# Patient Record
Sex: FEMALE | Race: WHITE | NOT HISPANIC OR LATINO | Employment: FULL TIME | URBAN - METROPOLITAN AREA
[De-identification: names, ages, dates, MRNs, and addresses within clinical notes are randomized per-mention and may not be internally consistent; named-entity substitution may affect disease eponyms.]

---

## 2017-01-20 ENCOUNTER — OFFICE VISIT - HEALTHEAST (OUTPATIENT)
Dept: FAMILY MEDICINE | Facility: CLINIC | Age: 47
End: 2017-01-20

## 2017-01-20 DIAGNOSIS — R35.0 FREQUENT URINATION: ICD-10-CM

## 2017-01-25 ENCOUNTER — OFFICE VISIT - HEALTHEAST (OUTPATIENT)
Dept: FAMILY MEDICINE | Facility: CLINIC | Age: 47
End: 2017-01-25

## 2017-01-25 ENCOUNTER — COMMUNICATION - HEALTHEAST (OUTPATIENT)
Dept: FAMILY MEDICINE | Facility: CLINIC | Age: 47
End: 2017-01-25

## 2017-01-25 DIAGNOSIS — N39.0 UTI (URINARY TRACT INFECTION): ICD-10-CM

## 2017-01-25 DIAGNOSIS — R30.0 DYSURIA: ICD-10-CM

## 2017-01-25 ASSESSMENT — MIFFLIN-ST. JEOR: SCORE: 1491.63

## 2017-02-03 ENCOUNTER — OFFICE VISIT - HEALTHEAST (OUTPATIENT)
Dept: FAMILY MEDICINE | Facility: CLINIC | Age: 47
End: 2017-02-03

## 2017-02-03 DIAGNOSIS — R30.0 DYSURIA: ICD-10-CM

## 2017-02-03 ASSESSMENT — MIFFLIN-ST. JEOR: SCORE: 1478.48

## 2017-02-10 ENCOUNTER — OFFICE VISIT - HEALTHEAST (OUTPATIENT)
Dept: FAMILY MEDICINE | Facility: CLINIC | Age: 47
End: 2017-02-10

## 2017-02-10 DIAGNOSIS — Z12.4 PAP SMEAR FOR CERVICAL CANCER SCREENING: ICD-10-CM

## 2017-02-10 DIAGNOSIS — Z00.00 ROUTINE GENERAL MEDICAL EXAMINATION AT A HEALTH CARE FACILITY: ICD-10-CM

## 2017-02-10 DIAGNOSIS — E78.00 ELEVATED CHOLESTEROL: ICD-10-CM

## 2017-02-10 LAB
CHOLEST SERPL-MCNC: 235 MG/DL
FASTING STATUS PATIENT QL REPORTED: NO
HDLC SERPL-MCNC: 64 MG/DL
LDLC SERPL CALC-MCNC: 152 MG/DL
TRIGL SERPL-MCNC: 95 MG/DL

## 2017-02-10 ASSESSMENT — MIFFLIN-ST. JEOR: SCORE: 1467.71

## 2017-02-16 LAB
BKR LAB AP ABNORMAL BLEEDING: NO
BKR LAB AP BIRTH CONTROL/HORMONES: NORMAL
BKR LAB AP CERVICAL APPEARANCE: NORMAL
BKR LAB AP GYN ADEQUACY: NORMAL
BKR LAB AP GYN INTERPRETATION: NORMAL
BKR LAB AP HPV REFLEX: NORMAL
BKR LAB AP LMP: NORMAL
BKR LAB AP PATIENT STATUS: NORMAL
BKR LAB AP PREVIOUS ABNORMAL: 2006
BKR LAB AP PREVIOUS NORMAL: 2014
HIGH RISK?: NO
HPV INTERPRETATION - HISTORICAL: NORMAL
HPV INTERPRETER - HISTORICAL: NORMAL
PATH REPORT.COMMENTS IMP SPEC: NORMAL
RESULT FLAG (HE HISTORICAL CONVERSION): NORMAL

## 2017-02-28 ENCOUNTER — COMMUNICATION - HEALTHEAST (OUTPATIENT)
Dept: FAMILY MEDICINE | Facility: CLINIC | Age: 47
End: 2017-02-28

## 2017-02-28 ENCOUNTER — AMBULATORY - HEALTHEAST (OUTPATIENT)
Dept: LAB | Facility: CLINIC | Age: 47
End: 2017-02-28

## 2017-02-28 DIAGNOSIS — E78.00 ELEVATED CHOLESTEROL: ICD-10-CM

## 2017-02-28 LAB
CHOLEST SERPL-MCNC: 224 MG/DL
FASTING STATUS PATIENT QL REPORTED: YES
HDLC SERPL-MCNC: 60 MG/DL
LDLC SERPL CALC-MCNC: 149 MG/DL
TRIGL SERPL-MCNC: 75 MG/DL

## 2018-04-06 ENCOUNTER — OFFICE VISIT - HEALTHEAST (OUTPATIENT)
Dept: FAMILY MEDICINE | Facility: CLINIC | Age: 48
End: 2018-04-06

## 2018-04-06 DIAGNOSIS — Z12.11 SCREEN FOR COLON CANCER: ICD-10-CM

## 2018-04-06 DIAGNOSIS — Z00.00 ROUTINE GENERAL MEDICAL EXAMINATION AT A HEALTH CARE FACILITY: ICD-10-CM

## 2018-04-06 DIAGNOSIS — Z12.31 VISIT FOR SCREENING MAMMOGRAM: ICD-10-CM

## 2018-04-06 LAB
ALBUMIN UR-MCNC: NEGATIVE MG/DL
APPEARANCE UR: CLEAR
BILIRUB UR QL STRIP: NEGATIVE
CHOLEST SERPL-MCNC: 228 MG/DL
COLOR UR AUTO: YELLOW
FASTING STATUS PATIENT QL REPORTED: NO
GLUCOSE UR STRIP-MCNC: NEGATIVE MG/DL
HDLC SERPL-MCNC: 68 MG/DL
HGB BLD-MCNC: 14.1 G/DL (ref 12–16)
HGB UR QL STRIP: ABNORMAL
KETONES UR STRIP-MCNC: NEGATIVE MG/DL
LDLC SERPL CALC-MCNC: 144 MG/DL
LEUKOCYTE ESTERASE UR QL STRIP: NEGATIVE
NITRATE UR QL: NEGATIVE
PH UR STRIP: 7 [PH] (ref 5–8)
SP GR UR STRIP: 1.01 (ref 1–1.03)
TRIGL SERPL-MCNC: 81 MG/DL
UROBILINOGEN UR STRIP-ACNC: ABNORMAL

## 2018-04-06 ASSESSMENT — MIFFLIN-ST. JEOR: SCORE: 1480.41

## 2018-04-09 LAB
HPV SOURCE: NORMAL
HUMAN PAPILLOMA VIRUS 16 DNA: NEGATIVE
HUMAN PAPILLOMA VIRUS 18 DNA: NEGATIVE
HUMAN PAPILLOMA VIRUS FINAL DIAGNOSIS: NORMAL
HUMAN PAPILLOMA VIRUS OTHER HR: NEGATIVE
SPECIMEN DESCRIPTION: NORMAL

## 2018-04-18 ENCOUNTER — AMBULATORY - HEALTHEAST (OUTPATIENT)
Dept: FAMILY MEDICINE | Facility: CLINIC | Age: 48
End: 2018-04-18

## 2018-04-18 DIAGNOSIS — R87.619 ABNORMAL PAP SMEAR OF CERVIX: ICD-10-CM

## 2018-04-18 LAB
BKR LAB AP ABNORMAL BLEEDING: YES
BKR LAB AP BIRTH CONTROL/HORMONES: ABNORMAL
BKR LAB AP CERVICAL APPEARANCE: NORMAL
BKR LAB AP GYN ADEQUACY: ABNORMAL
BKR LAB AP GYN INTERPRETATION: ABNORMAL
BKR LAB AP HPV REFLEX: ABNORMAL
BKR LAB AP LMP: ABNORMAL
BKR LAB AP PATIENT STATUS: ABNORMAL
BKR LAB AP PREVIOUS ABNORMAL: ABNORMAL
BKR LAB AP PREVIOUS NORMAL: ABNORMAL
HIGH RISK?: NO
INTERPRETING LAB: ABNORMAL
PATH REPORT.COMMENTS IMP SPEC: ABNORMAL
RESULT FLAG (HE HISTORICAL CONVERSION): ABNORMAL

## 2018-05-01 ENCOUNTER — COMMUNICATION - HEALTHEAST (OUTPATIENT)
Dept: FAMILY MEDICINE | Facility: CLINIC | Age: 48
End: 2018-05-01

## 2018-05-02 ENCOUNTER — COMMUNICATION - HEALTHEAST (OUTPATIENT)
Dept: ADMINISTRATIVE | Facility: CLINIC | Age: 48
End: 2018-05-02

## 2018-05-04 ENCOUNTER — HOSPITAL ENCOUNTER (OUTPATIENT)
Dept: MAMMOGRAPHY | Facility: CLINIC | Age: 48
Discharge: HOME | End: 2018-05-04
Attending: FAMILY MEDICINE

## 2018-05-04 DIAGNOSIS — Z12.31 VISIT FOR SCREENING MAMMOGRAM: ICD-10-CM

## 2018-05-22 ENCOUNTER — RECORDS - HEALTHEAST (OUTPATIENT)
Dept: ADMINISTRATIVE | Facility: OTHER | Age: 48
End: 2018-05-22

## 2019-04-05 ENCOUNTER — RECORDS - HEALTHEAST (OUTPATIENT)
Dept: ADMINISTRATIVE | Facility: OTHER | Age: 49
End: 2019-04-05

## 2020-07-23 ENCOUNTER — OFFICE VISIT - HEALTHEAST (OUTPATIENT)
Dept: FAMILY MEDICINE | Facility: CLINIC | Age: 50
End: 2020-07-23

## 2020-07-23 DIAGNOSIS — Z12.31 VISIT FOR SCREENING MAMMOGRAM: ICD-10-CM

## 2020-07-23 DIAGNOSIS — R30.0 BURNING WITH URINATION: ICD-10-CM

## 2020-07-23 DIAGNOSIS — N39.0 URINARY TRACT INFECTION WITHOUT HEMATURIA, SITE UNSPECIFIED: ICD-10-CM

## 2020-07-23 LAB
ALBUMIN UR-MCNC: NEGATIVE MG/DL
APPEARANCE UR: ABNORMAL
BILIRUB UR QL STRIP: NEGATIVE
COLOR UR AUTO: YELLOW
GLUCOSE UR STRIP-MCNC: NEGATIVE MG/DL
HGB UR QL STRIP: ABNORMAL
KETONES UR STRIP-MCNC: NEGATIVE MG/DL
LEUKOCYTE ESTERASE UR QL STRIP: ABNORMAL
NITRATE UR QL: NEGATIVE
PH UR STRIP: 7.5 [PH] (ref 5–8)
SP GR UR STRIP: 1.02 (ref 1–1.03)
UROBILINOGEN UR STRIP-ACNC: ABNORMAL

## 2020-07-23 ASSESSMENT — MIFFLIN-ST. JEOR: SCORE: 1426.97

## 2020-07-24 LAB — BACTERIA SPEC CULT: NO GROWTH

## 2020-08-24 ENCOUNTER — COMMUNICATION - HEALTHEAST (OUTPATIENT)
Dept: FAMILY MEDICINE | Facility: CLINIC | Age: 50
End: 2020-08-24

## 2020-08-24 ENCOUNTER — OFFICE VISIT - HEALTHEAST (OUTPATIENT)
Dept: FAMILY MEDICINE | Facility: CLINIC | Age: 50
End: 2020-08-24

## 2020-08-24 DIAGNOSIS — R87.618 OTHER ABNORMAL CYTOLOGICAL FINDING OF SPECIMEN FROM CERVIX: ICD-10-CM

## 2020-08-24 DIAGNOSIS — Z23 NEED FOR TD VACCINE: ICD-10-CM

## 2020-08-24 DIAGNOSIS — Z00.00 ROUTINE GENERAL MEDICAL EXAMINATION AT A HEALTH CARE FACILITY: ICD-10-CM

## 2020-08-24 DIAGNOSIS — Z12.31 VISIT FOR SCREENING MAMMOGRAM: ICD-10-CM

## 2020-08-24 LAB
ALBUMIN UR-MCNC: NEGATIVE MG/DL
APPEARANCE UR: CLEAR
BILIRUB UR QL STRIP: NEGATIVE
CHOLEST SERPL-MCNC: 234 MG/DL
COLOR UR AUTO: YELLOW
FASTING STATUS PATIENT QL REPORTED: NO
GLUCOSE UR STRIP-MCNC: NEGATIVE MG/DL
HDLC SERPL-MCNC: 71 MG/DL
HGB BLD-MCNC: 14.5 G/DL (ref 12–16)
HGB UR QL STRIP: ABNORMAL
KETONES UR STRIP-MCNC: NEGATIVE MG/DL
LDLC SERPL CALC-MCNC: 148 MG/DL
LEUKOCYTE ESTERASE UR QL STRIP: NEGATIVE
NITRATE UR QL: NEGATIVE
PH UR STRIP: 7 [PH] (ref 5–8)
SP GR UR STRIP: 1.01 (ref 1–1.03)
TRIGL SERPL-MCNC: 75 MG/DL
UROBILINOGEN UR STRIP-ACNC: ABNORMAL

## 2020-08-24 ASSESSMENT — MIFFLIN-ST. JEOR: SCORE: 1451.61

## 2020-08-26 ENCOUNTER — COMMUNICATION - HEALTHEAST (OUTPATIENT)
Dept: FAMILY MEDICINE | Facility: CLINIC | Age: 50
End: 2020-08-26

## 2020-09-03 ENCOUNTER — COMMUNICATION - HEALTHEAST (OUTPATIENT)
Dept: FAMILY MEDICINE | Facility: CLINIC | Age: 50
End: 2020-09-03

## 2020-09-03 LAB
BKR LAB AP ABNORMAL BLEEDING: NO
BKR LAB AP BIRTH CONTROL/HORMONES: NORMAL
BKR LAB AP CERVICAL APPEARANCE: NORMAL
BKR LAB AP GYN ADEQUACY: NORMAL
BKR LAB AP GYN INTERPRETATION: NORMAL
BKR LAB AP HPV REFLEX: NORMAL
BKR LAB AP LMP: NORMAL
BKR LAB AP PATIENT STATUS: NORMAL
BKR LAB AP PREVIOUS ABNORMAL: 2018
BKR LAB AP PREVIOUS NORMAL: 2017
HIGH RISK?: YES
PATH REPORT.COMMENTS IMP SPEC: NORMAL
RESULT FLAG (HE HISTORICAL CONVERSION): NORMAL

## 2020-10-28 ENCOUNTER — HOSPITAL ENCOUNTER (OUTPATIENT)
Dept: MAMMOGRAPHY | Facility: CLINIC | Age: 50
Discharge: HOME | End: 2020-10-28
Attending: FAMILY MEDICINE

## 2020-10-28 DIAGNOSIS — Z12.31 VISIT FOR SCREENING MAMMOGRAM: ICD-10-CM

## 2021-05-30 VITALS — WEIGHT: 188 LBS | BODY MASS INDEX: 29.23 KG/M2

## 2021-05-30 VITALS — BODY MASS INDEX: 28.72 KG/M2 | WEIGHT: 183 LBS | HEIGHT: 67 IN

## 2021-05-30 VITALS — HEIGHT: 67 IN | BODY MASS INDEX: 29.41 KG/M2 | WEIGHT: 187.4 LBS

## 2021-05-30 VITALS — BODY MASS INDEX: 28.96 KG/M2 | HEIGHT: 67 IN | WEIGHT: 184.5 LBS

## 2021-06-01 VITALS — WEIGHT: 185.8 LBS | HEIGHT: 67 IN | BODY MASS INDEX: 29.16 KG/M2

## 2021-06-04 VITALS
HEART RATE: 64 BPM | BODY MASS INDEX: 27.31 KG/M2 | HEIGHT: 67 IN | TEMPERATURE: 98 F | DIASTOLIC BLOOD PRESSURE: 64 MMHG | OXYGEN SATURATION: 96 % | WEIGHT: 174.02 LBS | RESPIRATION RATE: 16 BRPM | SYSTOLIC BLOOD PRESSURE: 120 MMHG

## 2021-06-04 VITALS
DIASTOLIC BLOOD PRESSURE: 70 MMHG | HEIGHT: 68 IN | SYSTOLIC BLOOD PRESSURE: 101 MMHG | OXYGEN SATURATION: 98 % | BODY MASS INDEX: 26.83 KG/M2 | WEIGHT: 177 LBS | HEART RATE: 61 BPM

## 2021-06-08 NOTE — PROGRESS NOTES
Penelope Kennedy is a 46 y.o. here for a Pap smear and physical exam. Patient is overall doing well. She is not fasting today.    Health Maintenance: Her last pap smear was done on 16 and returned normal. She has a history of abnormal pap smears and will have another pap smear today. She is due for a mammogram, her last was two years ago.  She was given information on how to go about scheduling that.  She is up to date on her immunizations. She has no medication allergies. All medications have been reconciled.     She is currently finishing up her dosage of Macrobid which she is on for urinary tract infection.  She feels like she has had complete resolution of her symptoms.  She will finish out the entire 10 day course as she had recurrence of her symptoms when she finished a 3 day course of Septra.  She will call with additional problems or concerns regarding that.    Healthy Habits:   Regular Exercise: No  Sunscreen Use: Yes  Healthy Diet: Yes  Dental Visits Regularly: Yes  Seat Belt: Yes  Sexually active: Yes  Self Breast Exam Monthly:Yes  Hemoccults: No  Flex Sig: No  Colonoscopy: No  Lipid Profile: Yes  Glucose Screen: Yes  Prevention of Osteoporosis: Yes  Last Dexa: No  Guns at Home:  Yes  Guns Safety Locks:  Yes  Domestic Violence:  No    Current Outpatient Medications Include:  No current outpatient prescriptions on file.    Allergies:  No Known Allergies    Past Medical History:   Diagnosis Date     abnormal pap smear 2006    had colposcopy, all fine since     Depression with anxiety      Normal delivery     x2     Ovarian cyst        Past Surgical History:   Procedure Laterality Date     COLPOSCOPY  ,      elective       pregnancy termination     PELVIC LAPAROSCOPY      for heavy bleeding and missed periods, noted endometriosis       Immunization History   Administered Date(s) Administered     Td, historic 2004     Tdap 2009       Family History   Problem  Relation Age of Onset     Adopted: Yes     Cancer Mother      malignant neoplasma of fallopian tube       Social History     Social History     Marital status:      Spouse name: Enzo     Number of children: 2     Years of education: N/A     Occupational History           [employer]     Social History Main Topics     Smoking status: Former Smoker     Packs/day: 0.50     Years: 20.00     Smokeless tobacco: Never Used     Alcohol use 0.5 oz/week     1 drink(s) per week      Comment: Occasionally     Drug use: No     Sexual activity: Yes     Partners: Male     Birth control/ protection: Surgical      Comment:  vasectomy     Other Topics Concern     Not on file     Social History Narrative   She works as a  for [employer]. Her son continues bowling and is planning to go to college for this in [place]. He is retaking the ACT tomorrow.     Last cholesterol:   Lab Results   Component Value Date    CHOL 235 (H) 02/10/2017    CHOL 197 01/05/2016    CHOL 207 (H) 12/17/2014     Lab Results   Component Value Date    HDL 64 02/10/2017    HDL 62 01/05/2016    HDL 74 12/17/2014     Lab Results   Component Value Date    LDLCALC 152 (H) 02/10/2017    LDLCALC 121 01/05/2016    LDLCALC 122 12/17/2014     Lab Results   Component Value Date    TRIG 95 02/10/2017    TRIG 70 01/05/2016    TRIG 57 12/17/2014         Last mammogram: 2015, given information and a phone number to call to schedule a mammogram in the near future.    Birth Control Method: Vasectomy   High Risk/Behavior: None    PREVENTATIVE SERVICES  Preventative services were reviewed today, 2/10/2017    LMP: Patient's last menstrual period was 01/20/2017.  Menstrual Regularity: Every 3 to 4 months  Flow: Normal    REVIEW OF SYSTEMS:  Denies fever, chills, visual changes, fatigue, myalgias, nasal congestion, rhinorrhea, ear pain or discharge, sore throat, swollen glands, breast mass, nipple discharge, breast changes, abdominal pain,  "nausea, vomiting, diarrhea, constipation, cough, shortness of breath, chest pain, weight change, change in bowel habits, melena, rectal bleeding, dysuria, frequency, urgency, hematuria, polyuria, polydipsia, polyphagia, joint pain or swelling or erythema, edema, rash, weakness, paresthesias, vaginal discharge or bleeding or mood changes.    She reports that she is feeling much better since starting her 10 day course of Macrobid for a UTI.  She has a couple days left of this. She does not get much exercise and feels tightness in her calves occasionally after sitting for long periods of time.  At this point when she gets up and walks around a little bit it seems like things are better.  We will continue to monitor this for now and if she has additional concerns will let me know.  Remainder of review of systems was negative.      PHYSICAL EXAM:  On exam, patient is a WD, WN 46 y.o. female in NAD.  Visit Vitals     /80     Pulse 64     Temp 97.6  F (36.4  C)     Ht 5' 7\" (1.702 m)     Wt 183 lb (83 kg)     LMP 01/20/2017     Breastfeeding No     BMI 28.66 kg/m2     Head normocephalic, atraumatic.  Eyes PERRL, ears TM s clear bilaterally.  Throat without significant erythemia or exudate.  Neck was supple, full range of motion. No significant lymphadenopathy or thyromegaly was appreciated.  Lungs clear to auscultation  Heart regular rate and rhythm.  Breast exam was done. No masses were appreciated. Axilla were clear bilaterally. No nipple discharge was appreciated. Self breast exam was reviewed and taught today.  Abdomen was soft, nontender, nondistended. No masses or organomegaly were palpated. Positive bowel sounds were appreciated.  Extremities with full range of motion of all 4 extremities were noted.  Deep tendon reflexes were equal and symmetrical. Motor and sensation were intact to both the upper and lower extremities.  Cranial nerves 2 through 12 were grossly intact.  EOM were intact.  Pelvic exam was done. "  External genitalia appeared normal. Speculum was introduced and the Pap smear obtained. Bimanual exam revealed uterus to be normal size and adenexa without palpable masses.     Recent Results (from the past 240 hour(s))   Urinalysis Macroscopic   Result Value Ref Range    Color, UA Yellow Colorless, Yellow, Straw, Light Yellow    Clarity, UA Clear Clear    Glucose, UA Negative Negative    Bilirubin, UA Negative Negative    Ketones, UA Negative Negative    Specific Gravity, UA 1.010 1.002 - 1.030    Blood, UA Moderate (!) Negative    pH, UA 7.5 4.5 - 8.0    Protein, UA Negative Negative mg/dL    Urobilinogen, UA 0.2 E.U./dL 0.2 E.U./dL, 1.0 E.U./dL    Nitrite, UA Negative Negative    Leukocytes, UA Large (!) Negative   Culture, Urine   Result Value Ref Range    Culture No Growth    Urine,Microscopic   Result Value Ref Range    Bacteria, UA Few (!) None Seen hpf    RBC, UA 0-2 None Seen, 0-2 hpf    WBC, UA 25-50 (!) None Seen, 0-5 hpf    Squam Epithel, UA 0-5 None Seen, 0-5 lpf    WBC Clumps Present (!) None Seen    Trans Epithel, UA 0-5 (!) None Seen lpf   Hemoglobin   Result Value Ref Range    Hemoglobin 13.6 12.0 - 16.0 g/dL   Lipid Cascade   Result Value Ref Range    Cholesterol 235 (H) <=199 mg/dL    Triglycerides 95 <=149 mg/dL    HDL Cholesterol 64 >=50 mg/dL    LDL Calculated 152 (H) <=129 mg/dL    Patient Fasting > 8hrs? No    Urinalysis Macroscopic   Result Value Ref Range    Color, UA Yellow Colorless, Yellow, Straw, Light Yellow    Clarity, UA Clear Clear    Glucose, UA Negative Negative    Bilirubin, UA Negative Negative    Ketones, UA Negative Negative    Specific Gravity, UA 1.015 1.002 - 1.030    Blood, UA Negative Negative    pH, UA 7.0 4.5 - 8.0    Protein, UA Negative Negative mg/dL    Urobilinogen, UA 0.2 E.U./dL 0.2 E.U./dL, 1.0 E.U./dL    Nitrite, UA Negative Negative    Leukocytes, UA Negative Negative       ASSESSMENT: 46 y.o. female physical exam and pap smear.    PLAN:     Routine general  medical examination at a health care facility [Z00.00]    1. Routine general medical examination at a health care facility  - Hemoglobin  - Lipid Cascade  - Urinalysis Macroscopic    2. Pap smear for cervical cancer screening  - Gynecologic Cytology (PAP Smear)        Patient is a 46 y.o. female who is overall doing well.  She is just finishing her course of Macrobid for her UTI.  She feels like things are better and if she has increasing symptoms after she finishes the Macrobid will certainly let me know.  Pap smear was repeated today since she does have a history of an abnormal Pap smear.  She otherwise feels like things are doing well and really has no other concerns.  All of her questions and concerns were addressed today.  If she has additional problems or concerns should let me know.    Will contact her with the results of the labs when available.    Karime Duenas M.D.       ADDITIONAL HISTORY SUMMARIZED (2): None.  DECISION TO OBTAIN EXTRA INFORMATION (1): None.   RADIOLOGY TESTS (1): None.  LABS (1): Ordered labs.  MEDICINE TESTS (1): None.  INDEPENDENT REVIEW (2 each): None.     The visit lasted a total of 27 minutes face to face with the patient. Over 50% of the time was spent counseling and educating the patient about annual physical exam.    I, Sita Vaughn, am scribing for and in the presence of, Dr. Duenas.    I, Dr. Duenas, personally performed the services described in this documentation, as scribed by Sita Vaughn in my presence, and it is both accurate and complete.    Total data points: 1

## 2021-06-08 NOTE — PROGRESS NOTES
ASSESSMENT:  Dysuria [R30.0]    1. Dysuria  - Urinalysis Macroscopic  - Culture, Urine  - Urine,Microscopic  - nitrofurantoin, macrocrystal-monohydrate, (MACROBID) 100 MG capsule; Take 1 capsule (100 mg total) by mouth 2 (two) times a day for 10 days.  Dispense: 20 capsule; Refill: 0      Patient overall seems to be doing okay.  She had evidence for urinary tract infection on 1/25/2017 when I saw her and was took the prescription for Septra that she was given at the walk-in center on 1/20/2017.  This is a 3 day treatment and she felt better after she had finished it but this morning now she wakes up with recurrence of her symptoms.  Urinalysis today shows large amount of leukocyte esterase as well as white blood cell clumps.  I am going to go ahead and treat her with Macrobid now for urinary tract infection.  We discussed the fact that I suspect that the Septra partially treated her since she had almost immediate relief of her symptoms but now they have recurred since she only took for 3 days treatment.  I did send a prescription for 10 day treatment for Macrobid.  Will contact her with results of the urine culture from today.  She should once again have immediate relief of her symptoms when she starts on this medication.  If she has any worsening of her symptoms will certainly let me know.  If she does not have gradual improvement and complete resolution of her symptoms over the next few days she certainly should let me know as well.  We otherwise will see her on an as-needed basis.  All of her questions were answered today.    PLAN:  There are no Patient Instructions on file for this visit.    Orders Placed This Encounter   Procedures     Culture, Urine     Urinalysis Macroscopic     Urine,Microscopic     Medications Discontinued During This Encounter   Medication Reason     multivitamin with minerals (THERA-M) 9 mg iron-400 mcg Tab tablet      omega-3 fatty acids-vitamin E (FISH OIL) 1,000 mg cap        Return if  symptoms worsen or fail to improve.    CHIEF COMPLAINT:  Chief Complaint   Patient presents with     Urinary Tract Infection     Recheck UTI, lower abdominal cramping/fullnes just finished antibiotic 1/28/17       HISTORY OF PRESENT ILLNESS:  Penelope is a 46 y.o. female presenting to the clinic today for a follow up regarding a UTI. She was seen at [facility] on 1/20/17 after experiencing urinary frequency for a few days. Her UA was suggestive of a UTI and she was prescribed Bactrim to take if symptoms worsened or the urine culture was positive. She was called the next day and was told her culture was negative so she did not start this antibiotic. With worsening symptoms, including painful urination and hematuria, she visited the clinic on 1/25/17.  Her urinalysis showed white blood cell clumps and was highly suggestive of a urinary tract infection and therefore I felt antibiotics were indicated.  She noted that she had the prescription for Septra already and so she was just going to fill that.  She started taking the septra and felt immediate relief. She finished this antibiotic on 1/28/17 and mentions that she felt good until this morning when she awoke feeling off. She is not currently having dysuria, but states that she has a feeling that she is about to get another UTI.  She denies that she seen any blood in her urine and she does not have any pain back pain or fevers currently.  She has had some lower abdominal cramping and fullness, but denies any blood in her urine.      Health Maintenance: She is coming in next week for her annual physical exam.       REVIEW OF SYSTEMS:   Denies fever, chills, visual changes, fatigue, myalgias, nasal congestion, rhinorrhea, ear pain or discharge, sore throat, swollen glands, abdominal pain, nausea, vomiting, diarrhea, constipation, cough, shortness of breath, chest pain, weight change, change in bowel habits, dysuria, frequency, urgency, hematuria, polyuria, polydipsia,  "polyphagia, rash, weakness, paresthesias, vaginal discharge or bleeding or mood changes.  She denies back pain. She mentions that her eyes became slightly swollen while taking Septra, but she otherwise tolerated it well. Remainder of review of systems was negative.    PFSH:  She has no known allergies to medications. Her sister slipped and broke her ankle earlier this week and had surgery yesterday.     Patient Active Problem List   Diagnosis     Irregular Length Of Menstrual Periods     Ovarian Cyst     Pain During Urination (Dysuria)       No Known Allergies    Past Medical History:   Diagnosis Date     abnormal smear of cervix 2014     Depression with anxiety      Normal delivery     x2     Ovarian cyst        Past Surgical History:   Procedure Laterality Date     COLPOSCOPY  ,      elective       pregnancy termination     PELVIC LAPAROSCOPY      for heavy bleeding and missed periods, noted endometriosis       History   Smoking Status     Former Smoker     Packs/day: 0.50     Years: 20.00   Smokeless Tobacco     Never Used       TOBACCO USE:  History   Smoking Status     Former Smoker     Packs/day: 0.50     Years: 20.00   Smokeless Tobacco     Never Used       VITALS:  Vitals:    17 0928   BP: 116/70   Patient Site: Right Arm   Patient Position: Sitting   Cuff Size: Adult Large   Pulse: 76   Resp: 14   Temp: 98.3  F (36.8  C)   TempSrc: Oral   Weight: 184 lb 8 oz (83.7 kg)   Height: 5' 7.25\" (1.708 m)     Wt Readings from Last 3 Encounters:   17 184 lb 8 oz (83.7 kg)   17 187 lb 6.4 oz (85 kg)   17 188 lb (85.3 kg)     Body mass index is 28.68 kg/(m^2).    PHYSICAL EXAM:  GENERAL APPEARANCE: A&A, NAD, well hydrated, well nourished  SKIN:  Normal skin turgor, no lesions/rashes   CV: RRR, no M/G/R   LUNGS: CTAB  ABDOMEN: S&NT, no masses or organomegaly, positive bowel sounds.  No CVA tenderness is appreciated.  No tenderness was appreciated over her " bladder.  EXTREMITY: no edema   NEURO: no gross deficits   PSYCHIATRIC;  Mood appropriate, memory intact    LABS:     Recent Results (from the past 240 hour(s))   Urinalysis Macroscopic   Result Value Ref Range    Color, UA Yellow Colorless, Yellow, Straw, Light Yellow    Clarity, UA Clear Clear    Glucose, UA Negative Negative    Bilirubin, UA Negative Negative    Ketones, UA Negative Negative    Specific Gravity, UA 1.010 1.002 - 1.030    Blood, UA Moderate (!) Negative    pH, UA 7.5 4.5 - 8.0    Protein, UA Negative Negative mg/dL    Urobilinogen, UA 0.2 E.U./dL 0.2 E.U./dL, 1.0 E.U./dL    Nitrite, UA Negative Negative    Leukocytes, UA Large (!) Negative   Culture, Urine   Result Value Ref Range    Culture No Growth    Urine,Microscopic   Result Value Ref Range    Bacteria, UA Few (!) None Seen hpf    RBC, UA 0-2 None Seen, 0-2 hpf    WBC, UA 25-50 (!) None Seen, 0-5 hpf    Squam Epithel, UA 0-5 None Seen, 0-5 lpf    WBC Clumps Present (!) None Seen    Trans Epithel, UA 0-5 (!) None Seen lpf       ADDITIONAL HISTORY SUMMARIZED (2): Reviewed walk in note from 1/20/17 regarding UTI.  DECISION TO OBTAIN EXTRA INFORMATION (1): None.   RADIOLOGY TESTS (1): None.  LABS (1): Ordered labs.  MEDICINE TESTS (1): None.  INDEPENDENT REVIEW (2 each): None.     The visit lasted a total of 12 minutes face to face with the patient. Over 50% of the time was spent counseling and educating the patient about UTI.    I, Kavin Vaughn, am scribing for and in the presence of, Dr. Duenas.    I, Dr. Duenas, personally performed the services described in this documentation, as scribed by Kavin Vaughn in my presence, and it is both accurate and complete.    MEDICATIONS:  Current Outpatient Prescriptions   Medication Sig Dispense Refill     nitrofurantoin, macrocrystal-monohydrate, (MACROBID) 100 MG capsule Take 1 capsule (100 mg total) by mouth 2 (two) times a day for 10 days. 20 capsule 0     No current facility-administered  medications for this visit.        Total data points: 3

## 2021-06-08 NOTE — PROGRESS NOTES
Assessment/Plan    ICD-10-CM    1. Frequent urination R35.0 Urinalysis-UC if Indicated     Culture, Urine     sulfamethoxazole-trimethoprim (BACTRIM DS) 800-160 mg per tablet     Symptoms and UA not certain for UTI.  Given paper prescription to take if symptoms become more classic, otherwise await culture results.       Patient Instructions   Drink lots of water.   Urine culture is pending.   Stop azo.   If symptoms increase can start the antibiotic otherwise wait for culture results.   Follow up if not improving or new symptoms.         The risks, benefits, and treatment options of prescribed medications or other treatments have been discussed with the patient. The patient should call or schedule a follow up appointment if not improvement or any new symptoms.       Subjective:    Penelope Kennedy is a 46 y.o. female who presents with   Chief Complaint   Patient presents with     Urinary Frequency     Poss UTI   .     She is noting pressure with urination, no burning.  No increased frequency now but this morning she felt she did.  She has been taking otc azo which has helped. She had symptoms in March that the culture was negative.  No back or flank pain.  No fever.  She is just getting over her period. Her periods have been irregular due to perimenopause.          Past medical history, social history and medications reviewed in the medical record.     ROS:  5 point review of systems negative except as noted above in the HPI, including Gen, Resp., CV, GI &  system review.     Exam:  Visit Vitals     /64 (Patient Site: Right Arm, Patient Position: Sitting, Cuff Size: Adult Regular)     Pulse 64     Temp 98.4  F (36.9  C) (Oral)     Wt 188 lb (85.3 kg)     LMP 11/23/2015     SpO2 98%     Breastfeeding No     BMI 29.23 kg/m2        Gen:  No acute distress  Lungs: Chest is clear, no wheezing or rales. Symmetric air entry throughout both lung fields.  Cardiac: regular rate and rhythm, no murmur rub or  gallop  Abdomen: soft, no hepatosplenomegaly.  No tenderness    Musculoskeletal:  No costovertebral angle tenderness   SKIN:  No rash       Results for orders placed or performed in visit on 01/20/17   Urinalysis-UC if Indicated   Result Value Ref Range    Color, UA Yellow Colorless, Yellow, Straw, Light Yellow    Clarity, UA Clear Clear    Glucose, UA Negative Negative    Bilirubin, UA Negative Negative    Ketones, UA Negative Negative    Specific Gravity, UA 1.010 1.002 - 1.030    Blood, UA Small (!) Negative    pH, UA 7.0 4.5 - 8.0    Protein, UA Negative Negative mg/dL    Urobilinogen, UA 0.2 E.U./dL 0.2 E.U./dL, 1.0 E.U./dL    Nitrite, UA Positive (!) Negative    Leukocytes, UA Trace (!) Negative    Bacteria, UA Few (!) None Seen hpf    RBC, UA 0-2 None Seen, 0-2 hpf    WBC, UA 0-5 None Seen, 0-5 hpf    Squam Epithel, UA 0-5 None Seen, 0-5 lpf       No results found.

## 2021-06-08 NOTE — PROGRESS NOTES
PROGRESS NOTE   1/25/2017    SUBJECTIVE:  Penelope Kennedy is a 46 y.o. female  who presents for   Chief Complaint   Patient presents with     Dysuria     Check dysuria, bloody discharge, frequency, urgency x 1 week     Patient comes in today for recheck of painful urination.  She was seen on 1/20/2017 at [facility] [place] for the same symptoms.  She notes that this all started last Friday when she thought that something felt funny when she went to the bathroom.  By Saturday morning she knew that there was a problem because she was having pain with urination and that stinging sensation as she ended her flow of urine.  She was seen in [facility] [place] and her urinalysis was not classic for urinary tract infection so they gave her paper copy of an antibiotic and told her to start the medicine if it seems like things were getting worse.  They called her the next day and told her that a urine culture was negative and therefore she never started the antibiotic.  She notes that on Sunday her symptoms seem to be better but by Monday she was still noticing a little bit of blood and it seemed like she was a little bit worse.  Yesterday she seemed to get a little bit better but now this morning again she is having lots of pain with urination and she also is noticing some bloody discharge when she urinates.  She does note that she has had increased urinary frequency when the pain gets bad with urination.  She has not been running any kind of fever at all and she has not had any unusual vaginal discharge.  She has not had any new sexual partners.  She just got over her menstrual cycle.  She only gets her menstrual cycle about every 6-9 months and it seems like when she does get a menstrual cycle she also gets these types of symptoms.  She is once again today miserable in terms of her symptoms similar to what she was on Saturday but in in between times she does seem like she had a little improvement in her symptoms which is a bit  "unusual.  She has no other symptoms of being ill other than the burning with urination and frequency.    Patient Active Problem List   Diagnosis     Irregular Length Of Menstrual Periods     Ovarian Cyst     Pain During Urination (Dysuria)     abnormal smear of cervix       Current Outpatient Prescriptions   Medication Sig Dispense Refill     multivitamin with minerals (THERA-M) 9 mg iron-400 mcg Tab tablet Take 1 tablet by mouth daily.       omega-3 fatty acids-vitamin E (FISH OIL) 1,000 mg cap Take 2,000 mg by mouth daily.       No current facility-administered medications for this visit.        No Known Allergies    Past Medical History   Diagnosis Date     Depression with anxiety      Normal delivery      x2     Ovarian cyst        Past Surgical History   Procedure Laterality Date     Colposcopy  ,      Elective        pregnancy termination     Pelvic laparoscopy       for heavy bleeding and missed periods, noted endometriosis       History   Smoking Status     Former Smoker     Packs/day: 0.50     Years: 20.00   Smokeless Tobacco     Never Used       OBJECTIVE:     Visit Vitals     /66 (Patient Site: Right Arm, Patient Position: Sitting, Cuff Size: Adult Regular)     Pulse 74  Comment: regular     Temp 97.6  F (36.4  C) (Oral)     Resp 14  Comment: regular     Ht 5' 7.25\" (1.708 m)     Wt 187 lb 6.4 oz (85 kg)     LMP 2015     BMI 29.13 kg/m2       Physical Exam:  GENERAL APPEARANCE: A&A, NAD, well hydrated, well nourished  SKIN:  Normal skin turgor, no lesions/rashes   CV: RRR, no M/G/R   LUNGS: CTAB  ABDOMEN: S&NT, no masses or organomegaly, positive bowel sounds.  No CVA tenderness was appreciated.  EXTREMITY: no edema   NEURO: no gross deficits   PSYCHIATRIC;  Mood appropriate, memory intact    LABS:     Recent Results (from the past 240 hour(s))   Urinalysis-UC if Indicated   Result Value Ref Range    Color, UA Yellow Colorless, Yellow, Straw, Light Yellow    Clarity, UA " Clear Clear    Glucose, UA Negative Negative    Bilirubin, UA Negative Negative    Ketones, UA Negative Negative    Specific Gravity, UA 1.010 1.002 - 1.030    Blood, UA Small (!) Negative    pH, UA 7.0 4.5 - 8.0    Protein, UA Negative Negative mg/dL    Urobilinogen, UA 0.2 E.U./dL 0.2 E.U./dL, 1.0 E.U./dL    Nitrite, UA Positive (!) Negative    Leukocytes, UA Trace (!) Negative    Bacteria, UA Few (!) None Seen hpf    RBC, UA 0-2 None Seen, 0-2 hpf    WBC, UA 0-5 None Seen, 0-5 hpf    Squam Epithel, UA 0-5 None Seen, 0-5 lpf   Culture, Urine   Result Value Ref Range    Culture No Growth    Urinalysis Macroscopic   Result Value Ref Range    Color, UA Yellow Colorless, Yellow, Straw, Light Yellow    Clarity, UA Slightly Cloudy (!) Clear    Glucose, UA Negative Negative    Bilirubin, UA Negative Negative    Ketones, UA Negative Negative    Specific Gravity, UA 1.015 1.002 - 1.030    Blood, UA Moderate (!) Negative    pH, UA 7.0 4.5 - 8.0    Protein, UA Negative Negative mg/dL    Urobilinogen, UA 0.2 E.U./dL 0.2 E.U./dL, 1.0 E.U./dL    Nitrite, UA Negative Negative    Leukocytes, UA Moderate (!) Negative   Culture, Urine   Result Value Ref Range    Culture No Growth    Urine,Microscopic   Result Value Ref Range    Bacteria, UA Few (!) None Seen hpf    RBC, UA 0-2 None Seen, 0-2 hpf    WBC, UA 25-50 (!) None Seen, 0-5 hpf    Squam Epithel, UA 0-5 None Seen, 0-5 lpf    WBC Clumps Present (!) None Seen       ASSESSMENT/PLAN:     Dysuria [R30.0]      1. Dysuria  - Urinalysis Macroscopic  - Urine,Microscopic  - Culture, Urine    2. UTI (urinary tract infection)    Patient overall seems to be doing okay.  Her urinalysis today is definitely suggestive of urinary tract infection and I am going to go ahead and have her start an antibiotic.  She has the prescription for Septra that was written a couple of days ago that she never filled and therefore will get that filled and try that.  I suspect that that will work very well for  her urinary tract infection given the fact that she does not get these very often.  She is going to go have the Septra filled and if she has more problems or concerns will let me know.  If she has worsening of her symptoms or does not have gradual improvement in her symptoms she should let me know as well.  Certainly she develops any fever or back pain or changes in her symptoms she should also let me know.  She is due for a physical exam and has scheduled that in the next couple of weeks and we can follow-up at that time and do a repeat urinalysis to make sure that this is completely resolved.  All of her questions were answered today.  We will see her back in a few weeks for her physical exam but certainly sooner if she has increasing problems.  Karime Duenas MD

## 2021-06-09 NOTE — PROGRESS NOTES
"Chief Complaint   Patient presents with     Urinary Tract Infection     x 1 week - tightnening/pulling pressure, cloudy       HPI: 50-year-old female, postmenopausal, presents today with 1 week of bladder pressure and a with cloudy urine.  She has a past history of urinary tract infections.    ROS: No back pain.  No fever    SH: Reviewed-see Snapshot for review.     FH: Reviewed-see Snapshot for review.    Meds:  Penelope has a current medication list which includes the following prescription(s): ciprofloxacin hcl.    O:  /64   Pulse 64   Temp 98  F (36.7  C)   Resp 16   Ht 5' 7\" (1.702 m)   Wt 174 lb 0.3 oz (78.9 kg)   SpO2 96%   Breastfeeding No   BMI 27.26 kg/m    Constitutional:    --Vitals as above  --No acute distress  Eyes-  --Sclera noninjected  --Lids and conjunctiva normal  Muscle skeletal-  --No CVA tenderness to palpation  Abdomen-  --Bladder soft nontender  Skin-  --Pink and dry    UA positive for leukocytes and RBCs    A/P:   1.  Burning with urination  UA positive.  Will treat with Cipro  - Urinalysis Macroscopic  - Culture, Urine    3. Urinary tract infection without hematuria, site unspecified  - ciprofloxacin HCl (CIPRO) 500 MG tablet; Take 1 tablet (500 mg total) by mouth 2 (two) times a day for 7 days.  Dispense: 14 tablet; Refill: 0  "

## 2021-06-10 NOTE — TELEPHONE ENCOUNTER
I did call patient in follow-up this morning and find out how she was doing.  She notes that she started to ice her shoulder yesterday and feels like today things are improved.  She does feel like she is gradually having improvement in her symptoms.  We discussed the fact that she should continue to have gradual improvement in her symptoms and if that is not the case she certainly should let us know.  All of her questions were answered today.  She notes that she is feeling a lot better about things now that she seems to be gradually improving.  We did discuss how important it is to move her arm around and keep those muscles and joints loose.

## 2021-06-10 NOTE — PROGRESS NOTES
Assessment:      Healthy female exam.      Plan:       All questions answered.  Await pap smear results.      ASSESSMENT: 50 y.o. female physical exam and pap smear.    PLAN:     Routine general medical examination at a health care facility [Z00.00]    1. Routine general medical examination at a health care facility  - Gynecologic Cytology (PAP Smear)  - Hemoglobin  - Urinalysis Macroscopic  - Lipid Cascade  - HPV High Risk DNA Cervical    2. Other abnormal cytological finding of specimen from cervix    3. Visit for screening mammogram  - Mammo Screening Bilateral; Future    4. Need for Td vaccine  - Td, Preservative Free (green label)      Patient is a 50 y.o. female who is overall doing well.  She is feeling well.  She does have a history of atypical glandular cells on her Pap smear in 2018.  Colposcopy was done at that time.  Follow-up Pap smear was completed today.  We will contact her with results of that when it returns.  She is due for mammogram and that was scheduled.  She is up-to-date with her colonoscopy.  We did discuss the fact that she is due for tetanus vaccination today.  Td was given today.  We also discussed the fact that she does not appear to have had hepatitis A hepatitis B or shingles vaccination.  We discussed the pros and cons of all of these immunizations and she would like to think about all of them.  She will talk with her insurance about coverage for shingles vaccination prior to getting it.  We did discuss the fact that she should get a flu shot later this year as well.  She declined HIV today as well.  All of her questions and concerns were addressed today.  If she has additional problems or concerns should let me know.    Will contact her with the results of the labs when available.  Karime Duenas M.D.       Subjective:      Penelope Kennedy is a 50 y.o. female who presents for an annual exam. The patient is sexually active. The patient participates in regular exercise: no.  The patient reports that there is not domestic violence in her life.  She overall feels like things are doing well.  She is stressed at the moment.  She is trying to help her brother who has had mental health issues for quite some time.  She is helping him get applied for disability and that is been quite stressful for her.  She knows that this is a taking a toll on her and her family.  She also has had some stress in her own life with work etc. and the pandemic.  She just recently called and got herself an appointment for counseling and she is quite looking forward to that.  She did just recently contact her birth mother as she is adopted and found out that her birth mother  but she just recently sent a letter to the birth mother's sister to see if she could obtain any information regarding her genetic history as her son just recently got  and is thinking about having babies.  She is hoping that this will work but of course that was quite stressful and quite emotional for her to go through that whole process.  Her brother also has a brain aneurysm that they are trying to work up and figure out what that is all about.  Again that is caused her quite a lot of pain.  She did have a history of atypical glandular cells on her Pap smear and 2018.  She had colposcopy at that time and has not had a menstrual cycle since then.  She is actually quite thankful for that.  She overall is feeling well.  She is due for a tetanus update today she is does not appear to have had hepatitis A or hepatitis B vaccinations.  We also discussed shingles vaccination at length today.  She did have a colonoscopy in 2019 so she is up-to-date on that.  She is due for mammogram.  She declines HIV testing.    Healthy Habits:   Regular Exercise: No  Sunscreen Use: Yes  Healthy Diet: Yes  Dental Visits Regularly: Yes  Seat Belt: Yes  Sexually active: Yes  Self Breast Exam Monthly:Yes  Hemoccults: N/A  Flex Sig: N/A  Colonoscopy:  Yes  Lipid Profile: No  Glucose Screen: N/A  Prevention of Osteoporosis: No  Last Dexa: No  Guns at Home:  Yes  Guns Safety Locks:  Yes      Immunization History   Administered Date(s) Administered     Td, Adult, Absorbed 2004     Td, adult adsorbed, PF 2020     Tdap 2009     Immunization status: up to date and documented, we discussed hepatitis A and hepatitis B vaccination as well as update of her tetanus vaccination.  We also discussed shingles vaccination.  Patient will have tetanus vaccination today but declines hepatitis A hepatitis B or shingles vaccination today.  She will call her insurance and find out about shingles coverage before she goes ahead and gets that vaccination..    No exam data present    Gynecologic History  No LMP recorded. Patient is menopausal.  Contraception: none  Last Pap: . Results were: abnormal, begin glandular cells.  Colposcopy was done.  Last mammogram: . Results were: normal      OB History    Para Term  AB Living   3 2 2 0 1 2   SAB TAB Ectopic Multiple Live Births   0 1 0 0 2      # Outcome Date GA Lbr Babak/2nd Weight Sex Delivery Anes PTL Lv   3 TAB            2 Term            1 Term                No current outpatient medications on file.     No current facility-administered medications for this visit.      Past Medical History:   Diagnosis Date     abnormal pap smear     had colposcopy, all fine since     Abnormal Pap smear of cervix 2018    had colposcopy, Dr Chapman at [facility], nl path     Depression with anxiety      H/O colonoscopy 2019    1 hyperplastic polyp found, repeat 10 yrs     Normal delivery     x2     Ovarian cyst      Past Surgical History:   Procedure Laterality Date     COLPOSCOPY  , , 2018    2018, nl pathology     elective       pregnancy termination     PELVIC LAPAROSCOPY      for heavy bleeding and missed periods, noted endometriosis     Patient has no known  allergies.  Family History   Adopted: Yes   Problem Relation Age of Onset     Cancer Mother         malignant neoplasm of fallopian tube     Diabetes Other         Patient found out lots of diabetes in her birth family     Social History     Socioeconomic History     Marital status:      Spouse name: Enzo     Number of children: 2     Years of education: Not on file     Highest education level: Not on file   Occupational History     Occupation:      Comment: [employer]   Social Needs     Financial resource strain: Not on file     Food insecurity     Worry: Not on file     Inability: Not on file     Transportation needs     Medical: Not on file     Non-medical: Not on file   Tobacco Use     Smoking status: Former Smoker     Packs/day: 0.50     Years: 20.00     Pack years: 10.00     Smokeless tobacco: Never Used   Substance and Sexual Activity     Alcohol use: Yes     Alcohol/week: 0.8 standard drinks     Types: 1 Standard drinks or equivalent per week     Frequency: 2-4 times a month     Drinks per session: 3 or 4     Binge frequency: Less than monthly     Comment: Occasionally     Drug use: No     Sexual activity: Yes     Partners: Male     Birth control/protection: Surgical     Comment:  vasectomy   Lifestyle     Physical activity     Days per week: Not on file     Minutes per session: Not on file     Stress: Not on file   Relationships     Social connections     Talks on phone: Not on file     Gets together: Not on file     Attends Muslim service: Not on file     Active member of club or organization: Not on file     Attends meetings of clubs or organizations: Not on file     Relationship status: Not on file     Intimate partner violence     Fear of current or ex partner: Not on file     Emotionally abused: Not on file     Physically abused: Not on file     Forced sexual activity: Not on file   Other Topics Concern     Not on file   Social History Narrative     Not on file          "      Objective:         Vitals:    08/24/20 1147   BP: 101/70   Pulse: 61   SpO2: 98%   Weight: 177 lb (80.3 kg)   Height: 5' 7.7\" (1.72 m)     Body mass index is 27.15 kg/m .       REVIEW OF SYSTEMS:   Denies fever, chills, visual changes, fatigue, myalgias, nasal congestion, rhinorrhea, ear pain or discharge, sore throat, swollen glands, breast mass, nipple discharge, breast changes, abdominal pain, nausea, vomiting, diarrhea, constipation, cough, shortness of breath, chest pain, weight change, change in bowel habits, melena, rectal bleeding, dysuria, frequency, urgency, hematuria, polyuria, polydipsia, polyphagia, joint pain or swelling or erythema, edema, rash, weakness, paresthesias, vaginal discharge or bleeding or mood changes.  Remainder of review of systems was negative.      PHYSICAL EXAM:  On exam, patient is a WD, WN 50 y.o. female in [facility].  /70   Pulse 61   Ht 5' 7.7\" (1.72 m)   Wt 177 lb (80.3 kg)   SpO2 98%   Breastfeeding No   BMI 27.15 kg/m    Head normocephalic, atraumatic.  Eyes PERRL, ears TM s clear bilaterally.  Throat without significant erythemia or exudate.  Neck was supple, full range of motion. No significant lymphadenopathy or thyromegaly was appreciated.  Lungs clear to auscultation  Heart regular rate and rhythm.  Breast exam was done. No masses were appreciated. Axilla were clear bilaterally. No nipple discharge was appreciated. Self breast exam was reviewed and taught today.  Abdomen was soft, nontender, nondistended. No masses or organomegaly were palpated. Positive bowel sounds were appreciated.  Extremities with full range of motion of all 4 extremities were noted.  Deep tendon reflexes were equal and symmetrical. Motor and sensation were intact to both the upper and lower extremities.  Cranial nerves 2 through 12 were grossly intact.  EOM were intact.  Pelvic exam was done.  External genitalia appeared normal. Speculum was introduced and the Pap smear obtained. " Bimanual exam revealed uterus to be normal size and adenexa without palpable masses.     LABS:    Recent Results (from the past 240 hour(s))   Hemoglobin   Result Value Ref Range    Hemoglobin 14.5 12.0 - 16.0 g/dL   Urinalysis Macroscopic   Result Value Ref Range    Color, UA Yellow Colorless, Yellow, Straw, Light Yellow    Clarity, UA Clear Clear    Glucose, UA Negative Negative    Bilirubin, UA Negative Negative    Ketones, UA Negative Negative    Specific Gravity, UA 1.010 1.005 - 1.030    Blood, UA Trace (!) Negative    pH, UA 7.0 5.0 - 8.0    Protein, UA Negative Negative mg/dL    Urobilinogen, UA 0.2 E.U./dL 0.2 E.U./dL, 1.0 E.U./dL    Nitrite, UA Negative Negative    Leukocytes, UA Negative Negative   Lipid Cascade   Result Value Ref Range    Cholesterol 234 (H) <=199 mg/dL    Triglycerides 75 <=149 mg/dL    HDL Cholesterol 71 >=50 mg/dL    LDL Calculated 148 (H) <=129 mg/dL    Patient Fasting > 8hrs? No    HPV High Risk DNA Cervical   Result Value Ref Range    HPV Source [facility]     HPV16 DNA Negative NEG    HPV18 DNA Negative NEG    Other HR HPV Negative NEG    Final Diagnosis SEE NOTES     Specimen Description Cervical Cells

## 2021-06-11 NOTE — PROGRESS NOTES
7/25/06 ASCUS, +HR HPV   8/29/06 colpo bx and ECC neg  10/19/07 NIL pap, neg HPV  2008 NIL pap, neg HPV  2009 NIL pap  2011, 2013 NIL pap, neg HPV  2014, 2016 NIL pap  2/10/17 NIL pap, neg HPV  4/6/18 atypical glandular cells of endometrial origin, neg HPV  5/22/18 colpo bx and ECC neg, EMB neg  8/24/20 NIL pap, neg HPV. Plan: cotest in 1 year

## 2021-06-17 NOTE — PROGRESS NOTES
Penelope Kennedy is a 48 y.o. here for a Pap smear and physical exam. Patient is overall doing well.  She just recently had her 48th birthday and is noting that she is overall feeling well.  She is due for mammogram will get that scheduled.  I did place an order for that today.  She is also due for colonoscopy now that she is age 48 and that order was placed as well.  She would like to delay that until probably the fall or so.  She is up-to-date with her immunizations.  She continues to have normal menstrual cycles although she has noted that she has had about 3 urinary tract infections in the past 2 years.  She thought that they were related to her menstrual cycle but as we talked further it sounds like they are related more to intercourse.  We talked about the fact that that is not uncommon as many people get urinary tract infections after having intercourse.  She notes that she has not had a urinary tract infection for quite some time and coincidentally her  is having erectile dysfunction currently because of a history of prostate cancer.  She does note that her menstrual cycles are becoming more irregular.  She does not think that she has had a menstrual cycle now for about the last 3 or 4 months.  She otherwise is doing well peer she does note that she is she also does not get nearly enough regular exercise.  She notes that sometimes to work at home and does not even make 1000 steps on her counter.    Healthy Habits:   Regular Exercise: No  Sunscreen Use: Yes  Healthy Diet: Yes  Dental Visits Regularly: Yes  Seat Belt: Yes  Sexually active: Yes  Self Breast Exam Monthly:Yes  Hemoccults: No  Flex Sig: No  Colonoscopy: No  Lipid Profile: Yes  Glucose Screen: Yes  Prevention of Osteoporosis: No  Last Dexa: No  Guns at Home:  No  Domestic Violence:  No    Current Outpatient Medications Include:  No current outpatient prescriptions on file.    Allergies:  No Known Allergies    Past Medical History:    Diagnosis Date     abnormal pap smear 2006    had colposcopy, all fine since     Depression with anxiety      Normal delivery     x2     Ovarian cyst        Past Surgical History:   Procedure Laterality Date     COLPOSCOPY  ,      elective       pregnancy termination     PELVIC LAPAROSCOPY      for heavy bleeding and missed periods, noted endometriosis       Immunization History   Administered Date(s) Administered     Td, Adult, Absorbed 2004     Tdap 2009       Family History   Problem Relation Age of Onset     Adopted: Yes     Cancer Mother      malignant neoplasm of fallopian tube       Social History     Social History     Marital status:      Spouse name: Enzo     Number of children: 2     Years of education: N/A     Occupational History           [employer]     Social History Main Topics     Smoking status: Former Smoker     Packs/day: 0.50     Years: 20.00     Smokeless tobacco: Never Used     Alcohol use 0.5 oz/week     1 drink(s) per week      Comment: Occasionally     Drug use: No     Sexual activity: Yes     Partners: Male     Birth control/ protection: Surgical      Comment:  vasectomy     Other Topics Concern     Not on file     Social History Narrative       Last cholesterol:   Lab Results   Component Value Date    CHOL 228 (H) 2018    CHOL 224 (H) 2017    CHOL 235 (H) 02/10/2017     Lab Results   Component Value Date    HDL 68 2018    HDL 60 2017    HDL 64 02/10/2017     Lab Results   Component Value Date    LDLCALC 144 (H) 2018    LDLCALC 149 (H) 2017    LDLCALC 152 (H) 02/10/2017     Lab Results   Component Value Date    TRIG 81 2018    TRIG 75 2017    TRIG 95 02/10/2017       Last mammogram: 1/13/15, order placed today and patient will schedule mammogram in the near future.    Birth Control Method: vasectomy  High Risk/Behavior: none    PREVENTATIVE SERVICES  Preventative services were  "reviewed today, 4/6/2018    LMP: Patient's last menstrual period was 01/15/2018.  Menstrual Regularity: irregular every 3-4 months  Flow: normal to heavy    REVIEW OF SYSTEMS:   Denies fever, chills, visual changes, fatigue, myalgias, nasal congestion, rhinorrhea, ear pain or discharge, sore throat, swollen glands, breast mass, nipple discharge, breast changes, abdominal pain, nausea, vomiting, diarrhea, constipation, cough, shortness of breath, chest pain, weight change, change in bowel habits, melena, rectal bleeding, dysuria, frequency, urgency, hematuria, polyuria, polydipsia, polyphagia, joint pain or swelling or erythema, edema, rash, weakness, paresthesias, vaginal discharge or bleeding or mood changes.  Remainder of review of systems was negative.      PHYSICAL EXAM:  On exam, patient is a WD, WN 48 y.o. female in NAD.  /72 (Patient Site: Right Arm, Patient Position: Sitting, Cuff Size: Adult Regular)  Pulse 66 Comment: regular  Temp 97.6  F (36.4  C) (Oral)   Resp 14 Comment: regular  Ht 5' 7\" (1.702 m)  Wt 185 lb 12.8 oz (84.3 kg)  LMP 01/15/2018  Breastfeeding? No  BMI 29.1 kg/m2  Head normocephalic, atraumatic.  Eyes PERRL, ears TM s clear bilaterally.  Throat without significant erythemia or exudate.  Neck was supple, full range of motion. No significant lymphadenopathy or thyromegaly was appreciated.  Lungs clear to auscultation  Heart regular rate and rhythm.  Breast exam was done. No masses were appreciated. Axilla were clear bilaterally. No nipple discharge was appreciated. Self breast exam was reviewed and taught today.  Abdomen was soft, nontender, nondistended. No masses or organomegaly were palpated. Positive bowel sounds were appreciated.  Extremities with full range of motion of all 4 extremities were noted.  Deep tendon reflexes were equal and symmetrical. Motor and sensation were intact to both the upper and lower extremities.  Cranial nerves 2 through 12 were grossly intact.  " EOM were intact.  Pelvic exam was done.  External genitalia appeared normal. Speculum was introduced and the Pap smear obtained. Bimanual exam revealed uterus to be normal size and adenexa without palpable masses.     Recent Results (from the past 240 hour(s))   Hemoglobin   Result Value Ref Range    Hemoglobin 14.1 12.0 - 16.0 g/dL   Urinalysis Macroscopic   Result Value Ref Range    Color, UA Yellow Colorless, Yellow, Straw, Light Yellow    Clarity, UA Clear Clear    Glucose, UA Negative Negative    Bilirubin, UA Negative Negative    Ketones, UA Negative Negative    Specific Gravity, UA 1.010 1.005 - 1.030    Blood, UA Trace (!) Negative    pH, UA 7.0 5.0 - 8.0    Protein, UA Negative Negative mg/dL    Urobilinogen, UA 0.2 E.U./dL 0.2 E.U./dL, 1.0 E.U./dL    Nitrite, UA Negative Negative    Leukocytes, UA Negative Negative   Lipid Cascade   Result Value Ref Range    Cholesterol 228 (H) <=199 mg/dL    Triglycerides 81 <=149 mg/dL    HDL Cholesterol 68 >=50 mg/dL    LDL Calculated 144 (H) <=129 mg/dL    Patient Fasting > 8hrs? No        ASSESSMENT: 48 y.o. female physical exam and pap smear.    PLAN:     Routine general medical examination at a health care facility [Z00.00]    1. Routine general medical examination at a health care facility  - Hemoglobin  - Urinalysis Macroscopic  - Lipid Cascade  - Gynecologic Cytology (PAP Smear)    2. Visit for screening mammogram  - Mammo Screening Bilateral; Future    3. Screen for colon cancer  - Ambulatory referral for Colonoscopy    Patient is a 48 y.o. female who is overall doing well.  She is due for mammogram as well as a colonoscopy in both of those were ordered today.  She does note that she is more tired than she has been in the past.  She thinks that is because she is not as active as she was before.  She is given try to exercise a more regular basis and will let me know if she continues to have difficulties with fatigue.  She notes that she has had more urinary tract  infections in the last couple of years but recently has not had problems with those.  She will continue to monitor and if she has additional problems or concerns will let me know.  She initially thought that these are related to her menstrual cycle but as we talked more I think that probably related to intercourse.  We discussed that this is not uncommon that women get urinary tract infections after intercourse.  She will continue to monitor these issues and let me know as she has not had a problem in the last few months.  She otherwise seems to be doing well.  All of her questions and concerns were addressed today.  If she has additional problems or concerns should let me know.    Will contact her with the results of the labs when available.  Karime Duenas M.D.

## 2021-06-17 NOTE — PROGRESS NOTES
I called and spoke with patient regarding the results of the Pap smear.  She needs to be referred to gynecology for further evaluation of the atypical glandular cells that were found on the Pap smear.  I will place referral to the gynecologist and someone from the office should contact her regarding getting that scheduled.  All of her questions were answered today.  If she has additional questions or concerns will let me know.

## 2021-06-27 ENCOUNTER — HEALTH MAINTENANCE LETTER (OUTPATIENT)
Age: 51
End: 2021-06-27

## 2021-09-08 ENCOUNTER — PATIENT OUTREACH (OUTPATIENT)
Dept: FAMILY MEDICINE | Facility: CLINIC | Age: 51
End: 2021-09-08

## 2021-10-17 ENCOUNTER — HEALTH MAINTENANCE LETTER (OUTPATIENT)
Age: 51
End: 2021-10-17

## 2021-10-27 ENCOUNTER — OFFICE VISIT (OUTPATIENT)
Dept: FAMILY MEDICINE | Facility: CLINIC | Age: 51
End: 2021-10-27
Payer: COMMERCIAL

## 2021-10-27 VITALS
BODY MASS INDEX: 27.18 KG/M2 | OXYGEN SATURATION: 97 % | SYSTOLIC BLOOD PRESSURE: 100 MMHG | HEIGHT: 67 IN | DIASTOLIC BLOOD PRESSURE: 68 MMHG | WEIGHT: 173.2 LBS | HEART RATE: 70 BPM

## 2021-10-27 DIAGNOSIS — R87.810 ASCUS WITH POSITIVE HIGH RISK HPV CERVICAL: ICD-10-CM

## 2021-10-27 DIAGNOSIS — Z00.00 ROUTINE GENERAL MEDICAL EXAMINATION AT A HEALTH CARE FACILITY: Primary | ICD-10-CM

## 2021-10-27 DIAGNOSIS — Z83.3 FAMILY HISTORY OF DIABETES MELLITUS: ICD-10-CM

## 2021-10-27 DIAGNOSIS — Z12.31 ENCOUNTER FOR SCREENING MAMMOGRAM FOR BREAST CANCER: ICD-10-CM

## 2021-10-27 DIAGNOSIS — R87.610 ASCUS WITH POSITIVE HIGH RISK HPV CERVICAL: ICD-10-CM

## 2021-10-27 LAB
ALBUMIN UR-MCNC: NEGATIVE MG/DL
APPEARANCE UR: CLEAR
BILIRUB UR QL STRIP: NEGATIVE
CHOLEST SERPL-MCNC: 218 MG/DL
COLOR UR AUTO: YELLOW
FASTING STATUS PATIENT QL REPORTED: YES
FASTING STATUS PATIENT QL REPORTED: YES
GLUCOSE BLD-MCNC: 93 MG/DL (ref 70–125)
GLUCOSE UR STRIP-MCNC: NEGATIVE MG/DL
HDLC SERPL-MCNC: 70 MG/DL
HGB BLD-MCNC: 14.6 G/DL (ref 11.7–15.7)
HGB UR QL STRIP: NEGATIVE
KETONES UR STRIP-MCNC: NEGATIVE MG/DL
LDLC SERPL CALC-MCNC: 135 MG/DL
LEUKOCYTE ESTERASE UR QL STRIP: NEGATIVE
NITRATE UR QL: NEGATIVE
PH UR STRIP: 7.5 [PH] (ref 5–8)
SP GR UR STRIP: 1.02 (ref 1–1.03)
TRIGL SERPL-MCNC: 64 MG/DL
UROBILINOGEN UR STRIP-ACNC: 0.2 E.U./DL

## 2021-10-27 PROCEDURE — 87624 HPV HI-RISK TYP POOLED RSLT: CPT | Performed by: FAMILY MEDICINE

## 2021-10-27 PROCEDURE — 81003 URINALYSIS AUTO W/O SCOPE: CPT | Performed by: FAMILY MEDICINE

## 2021-10-27 PROCEDURE — G0123 SCREEN CERV/VAG THIN LAYER: HCPCS | Performed by: FAMILY MEDICINE

## 2021-10-27 PROCEDURE — 85018 HEMOGLOBIN: CPT | Performed by: FAMILY MEDICINE

## 2021-10-27 PROCEDURE — 80061 LIPID PANEL: CPT | Performed by: FAMILY MEDICINE

## 2021-10-27 PROCEDURE — 99396 PREV VISIT EST AGE 40-64: CPT | Performed by: FAMILY MEDICINE

## 2021-10-27 PROCEDURE — 36415 COLL VENOUS BLD VENIPUNCTURE: CPT | Performed by: FAMILY MEDICINE

## 2021-10-27 PROCEDURE — 82947 ASSAY GLUCOSE BLOOD QUANT: CPT | Performed by: FAMILY MEDICINE

## 2021-10-27 ASSESSMENT — MIFFLIN-ST. JEOR: SCORE: 1423.26

## 2021-10-27 NOTE — PROGRESS NOTES
SUBJECTIVE:   CC: Penelope Kennedy is an 51 year old woman who presents for preventive health visit.       Patient has been advised of split billing requirements and indicates understanding: Yes  Healthy Habits:     Getting at least 3 servings of Calcium per day:  Yes    Bi-annual eye exam:  Yes    Dental care twice a year:  Yes    Sleep apnea or symptoms of sleep apnea:  None    Diet:  Regular (no restrictions)    Frequency of exercise:  1 day/week    Duration of exercise:  15-30 minutes    Taking medications regularly:  Not Applicable    Medication side effects:  Not applicable    PHQ-2 Total Score: 1    Additional concerns today:  Yes    Patient comes in today for physical exam.  Overall she is doing well.  She has no questions or concerns today.  She does note that she just recently was able to get some more information on her birth mother and found out that she had diabetes.  She was started on insulin at the age of 44.  Patient is wondering if we can do a glucose given the fact that her mom had a history of diabetes and we will go ahead and do that.  She is up-to-date on her immunizations for tetanus.  She got 1/20/2020.  She declines flu vaccination.  We did discuss shingles vaccination and she will think about that for future appointment.  She declines hepatitis A and hepatitis B vaccinations.  She already has had COVID vaccinations.  She is up-to-date with her colonoscopy having had one in 2019.  She needs another one in 10 years.  She is due for mammogram and we did discuss that.  Patient did have an abnormal Pap smear and that she had atypical glandular cells in 2018.  We will plan to repeat a Pap smear today.    Today's PHQ-2 Score:   PHQ-2 ( 1999 Pfizer) 10/20/2021   Q1: Little interest or pleasure in doing things 1   Q2: Feeling down, depressed or hopeless 0   PHQ-2 Score 1   Q1: Little interest or pleasure in doing things Several days   Q2: Feeling down, depressed or hopeless Not at all   PHQ-2  Score 1       Abuse: Current or Past (Physical, Sexual or Emotional) - No  Do you feel safe in your environment? Yes    Have you ever done Advance Care Planning? (For example, a Health Directive, POLST, or a discussion with a medical provider or your loved ones about your wishes): No, advance care planning information given to patient to review.  Patient declined advance care planning discussion at this time.  Honoring choices information was given today.    Social History     Tobacco Use     Smoking status: Former Smoker     Packs/day: 0.50     Years: 20.00     Pack years: 10.00     Smokeless tobacco: Never Used   Substance Use Topics     Alcohol use: Yes     Alcohol/week: 2.0 standard drinks     Types: 2 Standard drinks or equivalent per week     Comment: Alcoholic Drinks/day: Occasionally     If you drink alcohol do you typically have >3 drinks per day or >7 drinks per week? No    Alcohol Use 10/27/2021   Prescreen: >3 drinks/day or >7 drinks/week? -   Prescreen: >3 drinks/day or >7 drinks/week? No       Reviewed orders with patient.  Reviewed health maintenance and updated orders accordingly - Yes      Breast Cancer Screening:  Any new diagnosis of family breast, ovarian, or bowel cancer? No    FHS-7: No flowsheet data found.    Mammogram Screening: Recommended annual mammography  Pertinent mammograms are reviewed under the imaging tab.    History of abnormal Pap smear: Patient had history of abnormal Pap smear in 2018 with atypical glandular cells.  We will plan to repeat Pap smear today and then if that is normal go to every 3 years.  PAP / HPV Latest Ref Rng & Units 8/24/2020 4/6/2018 2/10/2017   PAP Negative for squamous intraepithelial lesion or malignancy. Negative for squamous intraepithelial lesion or malignancy  Electronically signed by Cecile Garces CT (ASCP) on 9/3/2020 at  7:59 AM   Atypical glandular cells of endometrial origin  Electronically signed by Feliciano Owens MD on 4/18/2018 at   9:59 AM   Negative for squamous intraepithelial lesion or malignancy  Electronically signed by Cecile Garces CT (ASCP) on 2017 at 11:06 AM     HPV16 NEG Negative Negative -   HPV18 NEG Negative Negative -   HRHPV NEG Negative Negative -     Reviewed and updated as needed this visit by clinical staff  Tobacco  Allergies  Meds   Med Hx  Surg Hx  Fam Hx  Soc Hx        Reviewed and updated as needed this visit by Provider  Tobacco  Allergies  Meds   Med Hx  Surg Hx  Fam Hx  Soc Hx       No current outpatient medications on file.     No Known Allergies     Past Medical History:   Diagnosis Date     Abnormal glandular Papanicolaou smear of cervix     had colpo and all fine since     Abnormal Pap smear of cervix 2018    had colposcopy, Dr Chapman at [facility], nl path     ASCUS with positive high risk HPV cervical 2006     Depression with anxiety      H/O colonoscopy 2019    1 hyperplastic polyp found, repeat 10 yrs     Normal delivery     x2     Other abnormal Papanicolaou smear of cervix and cervical HPV(795.09) 2006    had colposcopy, all fine since     Ovarian cyst         Past Surgical History:   Procedure Laterality Date     COLPOSCOPY  , , 2018    2018, nl pathology     OTHER SURGICAL HISTORY      elective abortionpregnancy termination     PELVIC LAPAROSCOPY      for heavy bleeding and missed periods, noted endometriosis        Family History   Adopted: Yes   Problem Relation Age of Onset     Cancer Mother         malignant neoplasm of fallopian tube     Diabetes Mother         started insulin at age 46,  age 62     Diabetes Other         Patient found out lots of diabetes in her birth family        Social History     Socioeconomic History     Marital status:      Spouse name: Not on file     Number of children: 2     Years of education: Not on file     Highest education level: Not on file   Occupational History     Occupation:  distribution oversight manager   Tobacco Use     Smoking status: Former Smoker     Packs/day: 0.50     Years: 20.00     Pack years: 10.00     Smokeless tobacco: Never Used   Vaping Use     Vaping Use: Never used   Substance and Sexual Activity     Alcohol use: Yes     Alcohol/week: 2.0 standard drinks     Types: 2 Standard drinks or equivalent per week     Comment: Alcoholic Drinks/day: Occasionally     Drug use: No     Sexual activity: Not Currently     Partners: Male   Other Topics Concern     Not on file   Social History Narrative     Not on file     Social Determinants of Health     Financial Resource Strain:      Difficulty of Paying Living Expenses:    Food Insecurity:      Worried About Running Out of Food in the Last Year:      Ran Out of Food in the Last Year:    Transportation Needs:      Lack of Transportation (Medical):      Lack of Transportation (Non-Medical):    Physical Activity:      Days of Exercise per Week:      Minutes of Exercise per Session:    Stress:      Feeling of Stress :    Social Connections:      Frequency of Communication with Friends and Family:      Frequency of Social Gatherings with Friends and Family:      Attends Orthodox Services:      Active Member of Clubs or Organizations:      Attends Club or Organization Meetings:      Marital Status:    Intimate Partner Violence:      Fear of Current or Ex-Partner:      Emotionally Abused:      Physically Abused:      Sexually Abused:         Immunization History   Administered Date(s) Administered     COVID-19,PF,Moderna 2021, 2021     TD (ADULT, 7+) 2020     Td (Adult), Adsorbed 2004     Tdap (Adacel,Boostrix) 2009        OB History    Para Term  AB Living   3 2 2 0 1 0   SAB TAB Ectopic Multiple Live Births   0 1 0 0 0      # Outcome Date GA Lbr Babak/2nd Weight Sex Delivery Anes PTL Lv   3 TAB            2 Term            1 Term                   Review of Systems  See below     OBJECTIVE:  "  /68   Pulse 70   Ht 1.702 m (5' 7\")   Wt 78.6 kg (173 lb 3.2 oz)   SpO2 97%   Breastfeeding No   BMI 27.13 kg/m    Physical Exam    REVIEW OF SYSTEMS:   Denies fever, chills, visual changes, fatigue, myalgias, nasal congestion, rhinorrhea, ear pain or discharge, sore throat, swollen glands, breast mass, nipple discharge, breast changes, abdominal pain, nausea, vomiting, diarrhea, constipation, cough, shortness of breath, chest pain, weight change, change in bowel habits, melena, rectal bleeding, dysuria, frequency, urgency, hematuria, polyuria, polydipsia, polyphagia, joint pain or swelling or erythema, edema, rash, weakness, paresthesias, vaginal discharge or bleeding or mood changes other than that mentioned above in HPI.   Remainder of review of systems was negative.      PHYSICAL EXAM:  On exam, patient is a WD, WN 51 year old female in NAD.  /68   Pulse 70   Ht 1.702 m (5' 7\")   Wt 78.6 kg (173 lb 3.2 oz)   SpO2 97%   Breastfeeding No   BMI 27.13 kg/m    Head normocephalic, atraumatic.  Eyes PERRL, ears TM s clear bilaterally.  Throat without significant erythemia or exudate.  Neck was supple, full range of motion. No significant lymphadenopathy or thyromegaly was appreciated.  Lungs clear to auscultation  Heart regular rate and rhythm.  Breast exam was done. No masses were appreciated. Axilla were clear bilaterally. No nipple discharge was appreciated. Self breast exam was reviewed and taught today.  Abdomen was soft, nontender, nondistended. No masses or organomegaly were palpated. Positive bowel sounds were appreciated.  Extremities with full range of motion of all 4 extremities were noted.  Deep tendon reflexes were equal and symmetrical. Motor and sensation were intact to both the upper and lower extremities.  Cranial nerves 2 through 12 were grossly intact.  EOM were intact.  Pelvic exam was done.  External genitalia appeared normal. Speculum was introduced and the Pap smear " obtained. Bimanual exam revealed uterus to be normal size and adenexa without palpable masses.   A&O x3, thought processes congruent, non-tangential. No hallucinations/delusions. Insight/judgment: intact. Denies suicidal/homicidal ideations.      LABS:    Recent Results (from the past 240 hour(s))   Hemoglobin    Collection Time: 10/27/21 10:11 AM   Result Value Ref Range    Hemoglobin 14.6 11.7 - 15.7 g/dL   Lipid panel reflex to direct LDL Fasting    Collection Time: 10/27/21 10:11 AM   Result Value Ref Range    Cholesterol 218 (H) <=199 mg/dL    Triglycerides 64 <=149 mg/dL    Direct Measure HDL 70 >=50 mg/dL    LDL Cholesterol Calculated 135 (H) <=129 mg/dL    Patient Fasting > 8hrs? Yes    Glucose    Collection Time: 10/27/21 10:11 AM   Result Value Ref Range    Glucose 93 70 - 125 mg/dL    Patient Fasting > 8hrs? Yes    UA reflex to Microscopic and Culture    Collection Time: 10/27/21 10:15 AM    Specimen: Urine, Midstream   Result Value Ref Range    Color Urine Yellow Colorless, Straw, Light Yellow, Yellow    Appearance Urine Clear Clear    Glucose Urine Negative Negative mg/dL    Bilirubin Urine Negative Negative    Ketones Urine Negative Negative mg/dL    Specific Gravity Urine 1.020 1.005 - 1.030    Blood Urine Negative Negative    pH Urine 7.5 5.0 - 8.0    Protein Albumin Urine Negative Negative mg/dL    Urobilinogen Urine 0.2 0.2, 1.0 E.U./dL    Nitrite Urine Negative Negative    Leukocyte Esterase Urine Negative Negative         ASSESSMENT/PLAN:     ASSESSMENT: 51 year old female physical exam and pap smear.     PLAN:     Routine general medical examination at a health care facility [Z00.00]    1. Routine general medical examination at a health care facility  - Gynecologic Cytology (PAP)  - Hemoglobin; Future  - Lipid panel reflex to direct LDL Fasting; Future  - UA reflex to Microscopic and Culture; Future  - Glucose; Future  - Hemoglobin  - Lipid panel reflex to direct LDL Fasting  - Glucose  - UA  reflex to Microscopic and Culture  - HPV High Risk Types DNA Cervical    2. ASCUS with positive high risk HPV cervical  - Gynecologic Cytology (PAP)  - HPV High Risk Types DNA Cervical    3. Family history of diabetes mellitus  - Glucose; Future  - Glucose    4. Encounter for screening mammogram for breast cancer  - *MA Screening Digital Bilateral; Future      Patient is a 51 year old female who is overall doing well.  She has had a history of atypical glandular cells on the Pap smear in 2018 and so Pap smear was repeated today.  We will contact her with results of that when he returns.  She just recently found out that her mom had diabetes was started on insulin at the age of 46.  Will check glucose today to follow-up on any family history of diabetes.  Mammogram was ordered today.  She is due for that and we did discuss that at length today.  She declined hepatitis A and hepatitis B vaccination she declines shingles vaccination.  She declined flu vaccination.  She is already had COVID vaccinations.  She declined HIV and hepatitis C testing.  She is up-to-date with her colonoscopy having had one in 2019.  She is up-to-date with her Tdap vaccination. All of her questions and concerns were addressed today.  If she has additional problems or concerns should let me know.    Will contact her with the results of the labs when available.    Voice recognition software was used in the creation of this note. Any grammatical or nonsensical errors are due to inherent errors with the software and are not the intention of the writer.      Karime Duenas MD       Patient has been advised of split billing requirements and indicates understanding: Yes  COUNSELING:  Reviewed preventive health counseling, as reflected in patient instructions       Regular exercise       Healthy diet/nutrition       Colon cancer screening       Consider Hep C screening for all patients one time for ages 18-79 years       HIV screeninx in teen  "years, 1x in adult years, and at intervals if high risk       (Elsa)menopause management    Estimated body mass index is 27.13 kg/m  as calculated from the following:    Height as of this encounter: 1.702 m (5' 7\").    Weight as of this encounter: 78.6 kg (173 lb 3.2 oz).        She reports that she has quit smoking. She has a 10.00 pack-year smoking history. She has never used smokeless tobacco.      Counseling Resources:  ATP IV Guidelines  Pooled Cohorts Equation Calculator  Breast Cancer Risk Calculator  BRCA-Related Cancer Risk Assessment: FHS-7 Tool  FRAX Risk Assessment  ICSI Preventive Guidelines  Dietary Guidelines for Americans, 2010  USDA's MyPlate  ASA Prophylaxis  Lung CA Screening    Karime Duenas MD  [facility]  "

## 2021-11-01 LAB
BKR LAB AP GYN ADEQUACY: NORMAL
BKR LAB AP GYN INTERPRETATION: NORMAL
BKR LAB AP HPV REFLEX: NORMAL
BKR LAB AP PREVIOUS ABNL DX: NORMAL
BKR LAB AP PREVIOUS ABNORMAL: NORMAL
HUMAN PAPILLOMA VIRUS 16 DNA: NEGATIVE
HUMAN PAPILLOMA VIRUS 18 DNA: NEGATIVE
HUMAN PAPILLOMA VIRUS FINAL DIAGNOSIS: NORMAL
HUMAN PAPILLOMA VIRUS OTHER HR: NEGATIVE
PATH REPORT.COMMENTS IMP SPEC: NORMAL
PATH REPORT.RELEVANT HX SPEC: NORMAL

## 2022-02-06 ENCOUNTER — HEALTH MAINTENANCE LETTER (OUTPATIENT)
Age: 52
End: 2022-02-06

## 2022-02-18 ENCOUNTER — E-VISIT (OUTPATIENT)
Dept: FAMILY MEDICINE | Facility: CLINIC | Age: 52
End: 2022-02-18
Payer: COMMERCIAL

## 2022-02-18 ENCOUNTER — TELEPHONE (OUTPATIENT)
Dept: FAMILY MEDICINE | Facility: CLINIC | Age: 52
End: 2022-02-18

## 2022-02-18 DIAGNOSIS — F41.9 ANXIETY: Primary | ICD-10-CM

## 2022-02-18 PROCEDURE — 99207 PR NON-BILLABLE SERV PER CHARTING: CPT | Performed by: FAMILY MEDICINE

## 2022-02-18 ASSESSMENT — ANXIETY QUESTIONNAIRES
GAD7 TOTAL SCORE: 13
4. TROUBLE RELAXING: MORE THAN HALF THE DAYS
8. IF YOU CHECKED OFF ANY PROBLEMS, HOW DIFFICULT HAVE THESE MADE IT FOR YOU TO DO YOUR WORK, TAKE CARE OF THINGS AT HOME, OR GET ALONG WITH OTHER PEOPLE?: VERY DIFFICULT
6. BECOMING EASILY ANNOYED OR IRRITABLE: MORE THAN HALF THE DAYS
5. BEING SO RESTLESS THAT IT IS HARD TO SIT STILL: NOT AT ALL
1. FEELING NERVOUS, ANXIOUS, OR ON EDGE: MORE THAN HALF THE DAYS
3. WORRYING TOO MUCH ABOUT DIFFERENT THINGS: NEARLY EVERY DAY
7. FEELING AFRAID AS IF SOMETHING AWFUL MIGHT HAPPEN: MORE THAN HALF THE DAYS
GAD7 TOTAL SCORE: 13
7. FEELING AFRAID AS IF SOMETHING AWFUL MIGHT HAPPEN: MORE THAN HALF THE DAYS
GAD7 TOTAL SCORE: 13
2. NOT BEING ABLE TO STOP OR CONTROL WORRYING: MORE THAN HALF THE DAYS

## 2022-02-18 NOTE — TELEPHONE ENCOUNTER
Pt called and agreed to 8am Monday, she also stated she feels safe until then and will call if that changes.    Said if by any chance she could get in today if there happens to be a cancellation, she would truly appreciate it

## 2022-02-18 NOTE — PATIENT INSTRUCTIONS
Thank you for choosing us for your care. I think an in-clinic visit would be best next steps based on your symptoms. Please schedule a clinic appointment; you won t be charged for this eVisit.   I could see you on Monday 2/21/2022 at 8 am or on Friday 2/25/2022 at 8 am. Would either of these dates and times work for you? Let me know by sending us a my chart message.   If you do not feel you can keep yourself safe, please present to ER prior to this.      You can schedule an appointment right here in [facility], or call [phone]

## 2022-02-18 NOTE — TELEPHONE ENCOUNTER
Dr. Duenas offered pt an 8am appointment on Monday 2/21 or Friday 2/25. Please help pt schedule if calls back.

## 2022-02-19 ASSESSMENT — ANXIETY QUESTIONNAIRES: GAD7 TOTAL SCORE: 13

## 2022-02-21 ENCOUNTER — OFFICE VISIT (OUTPATIENT)
Dept: FAMILY MEDICINE | Facility: CLINIC | Age: 52
End: 2022-02-21
Payer: COMMERCIAL

## 2022-02-21 VITALS
WEIGHT: 176 LBS | SYSTOLIC BLOOD PRESSURE: 114 MMHG | HEART RATE: 67 BPM | BODY MASS INDEX: 27.62 KG/M2 | DIASTOLIC BLOOD PRESSURE: 72 MMHG | HEIGHT: 67 IN | OXYGEN SATURATION: 97 %

## 2022-02-21 DIAGNOSIS — F41.9 ANXIETY: Primary | ICD-10-CM

## 2022-02-21 DIAGNOSIS — Z12.31 ENCOUNTER FOR SCREENING MAMMOGRAM FOR BREAST CANCER: ICD-10-CM

## 2022-02-21 LAB — TSH SERPL DL<=0.005 MIU/L-ACNC: 1.09 UIU/ML (ref 0.3–5)

## 2022-02-21 PROCEDURE — 36415 COLL VENOUS BLD VENIPUNCTURE: CPT | Performed by: FAMILY MEDICINE

## 2022-02-21 PROCEDURE — 84443 ASSAY THYROID STIM HORMONE: CPT | Performed by: FAMILY MEDICINE

## 2022-02-21 PROCEDURE — 96127 BRIEF EMOTIONAL/BEHAV ASSMT: CPT | Performed by: FAMILY MEDICINE

## 2022-02-21 PROCEDURE — 99215 OFFICE O/P EST HI 40 MIN: CPT | Performed by: FAMILY MEDICINE

## 2022-02-21 RX ORDER — CITALOPRAM HYDROBROMIDE 20 MG/1
20 TABLET ORAL DAILY
Qty: 90 TABLET | Refills: 0 | Status: SHIPPED | OUTPATIENT
Start: 2022-02-21 | End: 2022-05-16

## 2022-02-21 ASSESSMENT — ANXIETY QUESTIONNAIRES
6. BECOMING EASILY ANNOYED OR IRRITABLE: MORE THAN HALF THE DAYS
3. WORRYING TOO MUCH ABOUT DIFFERENT THINGS: MORE THAN HALF THE DAYS
GAD7 TOTAL SCORE: 13
5. BEING SO RESTLESS THAT IT IS HARD TO SIT STILL: NOT AT ALL
GAD7 TOTAL SCORE: 13
1. FEELING NERVOUS, ANXIOUS, OR ON EDGE: NEARLY EVERY DAY
7. FEELING AFRAID AS IF SOMETHING AWFUL MIGHT HAPPEN: MORE THAN HALF THE DAYS
4. TROUBLE RELAXING: MORE THAN HALF THE DAYS
2. NOT BEING ABLE TO STOP OR CONTROL WORRYING: MORE THAN HALF THE DAYS
7. FEELING AFRAID AS IF SOMETHING AWFUL MIGHT HAPPEN: MORE THAN HALF THE DAYS
GAD7 TOTAL SCORE: 13

## 2022-02-21 ASSESSMENT — PATIENT HEALTH QUESTIONNAIRE - PHQ9
SUM OF ALL RESPONSES TO PHQ QUESTIONS 1-9: 6
SUM OF ALL RESPONSES TO PHQ QUESTIONS 1-9: 6
10. IF YOU CHECKED OFF ANY PROBLEMS, HOW DIFFICULT HAVE THESE PROBLEMS MADE IT FOR YOU TO DO YOUR WORK, TAKE CARE OF THINGS AT HOME, OR GET ALONG WITH OTHER PEOPLE: VERY DIFFICULT

## 2022-02-22 ASSESSMENT — PATIENT HEALTH QUESTIONNAIRE - PHQ9: SUM OF ALL RESPONSES TO PHQ QUESTIONS 1-9: 6

## 2022-02-22 ASSESSMENT — ANXIETY QUESTIONNAIRES: GAD7 TOTAL SCORE: 13
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
## 2022-03-14 ASSESSMENT — ANXIETY QUESTIONNAIRES
6. BECOMING EASILY ANNOYED OR IRRITABLE: SEVERAL DAYS
7. FEELING AFRAID AS IF SOMETHING AWFUL MIGHT HAPPEN: SEVERAL DAYS
GAD7 TOTAL SCORE: 5
1. FEELING NERVOUS, ANXIOUS, OR ON EDGE: SEVERAL DAYS
5. BEING SO RESTLESS THAT IT IS HARD TO SIT STILL: NOT AT ALL
GAD7 TOTAL SCORE: 5
7. FEELING AFRAID AS IF SOMETHING AWFUL MIGHT HAPPEN: SEVERAL DAYS
2. NOT BEING ABLE TO STOP OR CONTROL WORRYING: SEVERAL DAYS
3. WORRYING TOO MUCH ABOUT DIFFERENT THINGS: SEVERAL DAYS
4. TROUBLE RELAXING: NOT AT ALL
GAD7 TOTAL SCORE: 5

## 2022-03-15 ASSESSMENT — ANXIETY QUESTIONNAIRES: GAD7 TOTAL SCORE: 5

## 2022-03-21 ENCOUNTER — OFFICE VISIT (OUTPATIENT)
Dept: FAMILY MEDICINE | Facility: CLINIC | Age: 52
End: 2022-03-21
Payer: COMMERCIAL

## 2022-03-21 VITALS
HEART RATE: 57 BPM | WEIGHT: 176 LBS | BODY MASS INDEX: 27.57 KG/M2 | OXYGEN SATURATION: 97 % | SYSTOLIC BLOOD PRESSURE: 120 MMHG | DIASTOLIC BLOOD PRESSURE: 80 MMHG

## 2022-03-21 DIAGNOSIS — R10.2 PELVIC PAIN IN FEMALE: ICD-10-CM

## 2022-03-21 DIAGNOSIS — F41.9 ANXIETY: Primary | ICD-10-CM

## 2022-03-21 DIAGNOSIS — R61 SWEATING INCREASE: ICD-10-CM

## 2022-03-21 DIAGNOSIS — N89.8 VAGINAL DISCHARGE: ICD-10-CM

## 2022-03-21 LAB
CLUE CELLS: ABNORMAL
ERYTHROCYTE [DISTWIDTH] IN BLOOD BY AUTOMATED COUNT: 12.9 % (ref 10–15)
HCT VFR BLD AUTO: 41.8 % (ref 35–47)
HGB BLD-MCNC: 13.9 G/DL (ref 11.7–15.7)
MCH RBC QN AUTO: 31 PG (ref 26.5–33)
MCHC RBC AUTO-ENTMCNC: 33.3 G/DL (ref 31.5–36.5)
MCV RBC AUTO: 93 FL (ref 78–100)
PLATELET # BLD AUTO: 242 10E3/UL (ref 150–450)
RBC # BLD AUTO: 4.49 10E6/UL (ref 3.8–5.2)
TRICHOMONAS, WET PREP: ABNORMAL
WBC # BLD AUTO: 5.8 10E3/UL (ref 4–11)
WBC'S/HIGH POWER FIELD, WET PREP: ABNORMAL
YEAST, WET PREP: ABNORMAL

## 2022-03-21 PROCEDURE — 87389 HIV-1 AG W/HIV-1&-2 AB AG IA: CPT | Performed by: FAMILY MEDICINE

## 2022-03-21 PROCEDURE — 36415 COLL VENOUS BLD VENIPUNCTURE: CPT | Performed by: FAMILY MEDICINE

## 2022-03-21 PROCEDURE — 87210 SMEAR WET MOUNT SALINE/INK: CPT | Performed by: FAMILY MEDICINE

## 2022-03-21 PROCEDURE — 87591 N.GONORRHOEAE DNA AMP PROB: CPT | Performed by: FAMILY MEDICINE

## 2022-03-21 PROCEDURE — 99214 OFFICE O/P EST MOD 30 MIN: CPT | Performed by: FAMILY MEDICINE

## 2022-03-21 PROCEDURE — 80053 COMPREHEN METABOLIC PANEL: CPT | Performed by: FAMILY MEDICINE

## 2022-03-21 PROCEDURE — 84443 ASSAY THYROID STIM HORMONE: CPT | Performed by: FAMILY MEDICINE

## 2022-03-21 PROCEDURE — 85027 COMPLETE CBC AUTOMATED: CPT | Performed by: FAMILY MEDICINE

## 2022-03-21 PROCEDURE — 86780 TREPONEMA PALLIDUM: CPT | Performed by: FAMILY MEDICINE

## 2022-03-21 PROCEDURE — 87491 CHLMYD TRACH DNA AMP PROBE: CPT | Performed by: FAMILY MEDICINE

## 2022-03-21 RX ORDER — METRONIDAZOLE 500 MG/1
500 TABLET ORAL 2 TIMES DAILY
Qty: 14 TABLET | Refills: 0 | Status: SHIPPED | OUTPATIENT
Start: 2022-03-21 | End: 2022-03-28

## 2022-03-21 ASSESSMENT — PATIENT HEALTH QUESTIONNAIRE - PHQ9
SUM OF ALL RESPONSES TO PHQ QUESTIONS 1-9: 6
10. IF YOU CHECKED OFF ANY PROBLEMS, HOW DIFFICULT HAVE THESE PROBLEMS MADE IT FOR YOU TO DO YOUR WORK, TAKE CARE OF THINGS AT HOME, OR GET ALONG WITH OTHER PEOPLE: SOMEWHAT DIFFICULT
SUM OF ALL RESPONSES TO PHQ QUESTIONS 1-9: 6

## 2022-03-21 NOTE — PROGRESS NOTES
PROGRESS NOTE   3/21/2022    SUBJECTIVE:  Penelope Kennedy is a 52 year old female who presents for     Chief Complaint   Patient presents with     Medication Update     Vaginal Problem     vaginal discharge x 6 weeks, reddness in color, not painful but pelvic area just feels different      Patient comes in today for recheck of her anxiety.  I saw her on 2/21/2022 for increase in anxiety.  At that time she was having a difficult time even functioning and doing what she needed to do.  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  Both of her adult sons have returned home 1 is getting a divorce and the other one is leaving a relationship in [place] and is acting out quite tremendously with unhealthy behaviors such as addiction to Adderall and alcohol etc.  At that point patient was started on some Celexa for anxiety.  She does have a counselor that she is seeing and she is continuing to see the therapist.  Her PHQ-9 and JOHN today have been decreased significantly.  Her PHQ-9 remains at about 6 but her JOHN is 5 today which is down from 13.  She notes that overall since starting the Celexa about a month ago she is feeling so much better.  There is nothing that is changed in terms of the things in her life that are stressful but she is handling them a lot better and she is in a much better position.  She is not having any problems with or side effects with the Celexa.  She was little bit nauseated when she first started the medication but that is completely resolved.  She does note that she seems to be sweating a lot and she is not sure exactly if that is menopause or for something else.  She is due for mammogram and that was ordered the last time she was here about a month ago but she said that no one called her to get that scheduled so we will go ahead and give her the phone number today.  She has not had the opportunity to call  her insurance company regarding shingles vaccination.  She declines flu vaccination as well as hepatitis A and hepatitis B vaccination today.  Her other concern today is that she has had vaginal discharge about the last 6 weeks or so.  She has a hard time explaining what this discharges but it just feels different down there.  Her pelvic area feels different than it has been.  She is not really have any itching and she is not having burning just all of a sudden she started to notice that she was having some discharge.  She really is unable to tell me what color it is but is certainly not like hong blood and is not like a menstrual cycle.  She has not had intercourse recently and admits that is been many months since she has had intercourse.  She does have a history of a ovarian cyst but has not had a menstrual cycle now for about the last 2 to 3 years.  The discharge is just enough that it gets in her underwear but she does not have to wear a pad and there is nothing on the pad if she were to wear it.  It seems like the discharge may be a bit bloody is a scab a little reddish hue to it but is not bleeding as per se.  She does note that she has been sweating a lot more both day and night and so she is wondering if maybe that is contributing to this may be there is just some irritation in that area because of all the sweating she is doing recently.    Patient Active Problem List   Diagnosis     Irregular Length Of Menstrual Periods     Ovarian Cyst     ASCUS with positive high risk HPV cervical     Anxiety       Current Outpatient Medications   Medication Sig Dispense Refill     citalopram (CELEXA) 20 MG tablet Take 1 tablet (20 mg) by mouth daily 90 tablet 0     metroNIDAZOLE (FLAGYL) 500 MG tablet Take 1 tablet (500 mg) by mouth 2 times daily for 7 days 14 tablet 0       No Known Allergies    Past Medical History:   Diagnosis Date     Abnormal glandular Papanicolaou smear of cervix 2018    had colpo and all fine  since     Abnormal Pap smear of cervix 05/01/2018    had colposcopy, Dr Chapman at [facility], nl path     ASCUS with positive high risk HPV cervical 07/25/2006     Depression with anxiety      H/O colonoscopy 04/01/2019    1 hyperplastic polyp found, repeat 10 yrs     Normal delivery     x2     Other abnormal Papanicolaou smear of cervix and cervical HPV(795.09) 01/01/2006    had colposcopy, all fine since     Ovarian cyst        Past Surgical History:   Procedure Laterality Date     COLPOSCOPY  1999, 2006, 2018 2018, nl pathology     OTHER SURGICAL HISTORY  1987    elective abortionpregnancy termination     PELVIC LAPAROSCOPY  2011    for heavy bleeding and missed periods, noted endometriosis       History   Smoking Status     Former Smoker     Packs/day: 0.50     Years: 20.00   Smokeless Tobacco     Never Used       OBJECTIVE:     /80   Pulse 57   Wt 79.8 kg (176 lb)   SpO2 97%   Breastfeeding No   BMI 27.57 kg/m      Physical Exam:  GENERAL APPEARANCE: A&A, NAD, well hydrated, well nourished  SKIN:  Normal skin turgor, no lesions/rashes   HEENT: moist mucous membranes, no rhinorrhea, PERRLA, TM's clear bilaterally, Throat without significant erythema or exudate.  NECK: Supple, full ROM, no significant lymphadenopathy or thyromegaly  CV: RRR, no M/G/R   LUNGS: CTAB  ABDOMEN: S&NT, no masses or organomegaly  Pelvic exam was done.  External genitalia appears normal.  Speculum is introduced and a small amount of vaginal discharge was noted.  It had a very fishy odor to it.  Discharge appeared yellowish to greenish in color.  Adnexa without palpable masses.  Uterus was normal sized.  She did not have any cervical motion tenderness that was appreciated.   EXTREMITY: no swelling noted.  Full range of motion of all 4 extremities.   NEURO: no gross deficits   PSYCHIATRIC;  Mood appropriate, memory intact  A&O x3, thought processes congruent, non-tangential. No hallucinations/delusions. Insight/judgment:  intact. Denies suicidal/homicidal ideations.    PHQ-9:  Last PHQ-9 3/21/2022   1.  Little interest or pleasure in doing things 1   2.  Feeling down, depressed, or hopeless 1   3.  Trouble falling or staying asleep, or sleeping too much 1   4.  Feeling tired or having little energy 1   5.  Poor appetite or overeating 0   6.  Feeling bad about yourself 1   7.  Trouble concentrating 1   8.  Moving slowly or restless 0   Q9: Thoughts of better off dead/self-harm past 2 weeks 0   PHQ-9 Total Score 6       GAD7:  JOHN-7  3/14/2022   1. Feeling nervous, anxious, or on edge 1   2. Not being able to stop or control worrying 1   3. Worrying too much about different things 1   4. Trouble relaxing 0   5. Being so restless that it is hard to sit still 0   6. Becoming easily annoyed or irritable 1   7. Feeling afraid, as if something awful might happen 1   JOHN-7 Total Score 5       LABS:     Recent Results (from the past 240 hour(s))   HIV Antigen Antibody Combo    Collection Time: 03/21/22  5:00 PM   Result Value Ref Range    HIV Antigen Antibody Combo Negative Negative   CBC with platelets    Collection Time: 03/21/22  5:00 PM   Result Value Ref Range    WBC Count 5.8 4.0 - 11.0 10e3/uL    RBC Count 4.49 3.80 - 5.20 10e6/uL    Hemoglobin 13.9 11.7 - 15.7 g/dL    Hematocrit 41.8 35.0 - 47.0 %    MCV 93 78 - 100 fL    MCH 31.0 26.5 - 33.0 pg    MCHC 33.3 31.5 - 36.5 g/dL    RDW 12.9 10.0 - 15.0 %    Platelet Count 242 150 - 450 10e3/uL   Comprehensive metabolic panel    Collection Time: 03/21/22  5:00 PM   Result Value Ref Range    Sodium 141 136 - 145 mmol/L    Potassium 4.3 3.5 - 5.0 mmol/L    Chloride 101 98 - 107 mmol/L    Carbon Dioxide (CO2) 27 22 - 31 mmol/L    Anion Gap 13 5 - 18 mmol/L    Urea Nitrogen 15 8 - 22 mg/dL    Creatinine 0.78 0.60 - 1.10 mg/dL    Calcium 9.7 8.5 - 10.5 mg/dL    Glucose 96 70 - 125 mg/dL    Alkaline Phosphatase 76 45 - 120 U/L    AST 20 0 - 40 U/L    ALT 31 0 - 45 U/L    Protein Total 7.3 6.0 -  8.0 g/dL    Albumin 4.1 3.5 - 5.0 g/dL    Bilirubin Total 1.2 (H) 0.0 - 1.0 mg/dL    GFR Estimate 90 >60 mL/min/1.73m2   TSH with free T4 reflex    Collection Time: 03/21/22  5:00 PM   Result Value Ref Range    TSH 1.47 0.30 - 5.00 uIU/mL   Wet preparation    Collection Time: 03/21/22  5:02 PM    Specimen: Vagina; Swab   Result Value Ref Range    Trichomonas Absent Absent    Yeast Absent Absent    Clue Cells Absent Absent    WBCs/high power field 3+ (A) None          ASSESSMENT/PLAN:     Anxiety    Vaginal discharge  - Wet preparation  - Chlamydia trachomatis/Neisseria gonorrhoeae by PCR  - HIV Antigen Antibody Combo  - Treponema Abs w Reflex to RPR and Titer  - US Pelvic Complete with Transvaginal  - HIV Antigen Antibody Combo  - Treponema Abs w Reflex to RPR and Titer  - Wet preparation  - Chlamydia trachomatis/Neisseria gonorrhoeae by PCR  - metroNIDAZOLE (FLAGYL) 500 MG tablet  Dispense: 14 tablet; Refill: 0    Pelvic pain in female  - US Pelvic Complete with Transvaginal    Sweating increase  - CBC with platelets  - Comprehensive metabolic panel  - TSH with free T4 reflex  - CBC with platelets  - Comprehensive metabolic panel  - TSH with free T4 reflex    Patient comes in today for follow-up of her anxiety as well as a new complaint of vaginal discharge.  In terms of her anxiety she overall seems to be doing much better.  I saw her a month ago and she was really having difficulty even functioning and doing what she needed to do on a daily basis.  She was not suicidal at that time.  We started her on some Celexa and she has been doing well on that.  She is not having any side effects as a result of this medication and she feels like overall she is doing so much better.  She feels like she is in a better place.  Nothing is really changed in terms of the stress in her life including her 2 boys as well as her ex- and her brother who has early onset dementia however she is handling it all better.  She  continues to see a therapist on a regular basis and feels like that is really helpful as well.  She will continue to see the therapist will continue on this medication.  I did give her 90 days when she was here a month ago so she should have enough to get her through the next couple of months.  When she needs a refill will certainly let me know.  I have asked her to follow-up in about 4 to 5 months for recheck of the medicine.  Certainly if she has additional problems prior to that we will let me know.  In terms of the vaginal discharge wet prep was done today which was negative.  Chlamydia and gonorrhea HIV and syphilis testing were done today as well.  I do think we should get a pelvic ultrasound given the fact that she has had this discharge and has a little reddish hue to it in her opinion.  To me today the discharge appeared greenish to yellowish in color and she did not have any cervical motion tenderness at all.  I do think getting the ultrasound is a good idea but I also think that we should treat her as if she has bacterial vaginosis because the discharge certainly had that particular odor to it.  We will go ahead and treat her with Flagyl today.  She is to use Flagyl twice a day for the next 7 days.  I did caution her that she should drink absolutely no alcohol while she is on this medication.  She actually is going away in a couple of days to [place] for a long weekend with her girlfriends and so wants to be able to drink there and so we will start the medication when she returns home.  I did today do a CBC as well as metabolic panel and thyroid level to check on the increase in the sweating.  This may very well just be a manifestation of menopause for her but I want to make sure that there is nothing else going on that could be contributing to her sweating.  She declined flu hepatitis B and hepatitis a vaccinations today.  She will call her insurance regarding getting in for shingles vaccination.  She  does have a mammogram order and did not receive a phone call after I ordered it about a month ago so she was given the phone number to call and schedule a mammogram.  I did let her know that the mammogram and the ultrasound are done at the same location so certainly they can be done at the same time if that is convenient for her.  I will contact her with results of the ultrasound when it returns.  If she has changes in her symptoms should certainly let me know.  We otherwise will see her in about 4 5 months for follow-up. 32 minutes spent on the day of encounter doing chart review, history and exam, counseling, coordination of care, documentation and other activities as noted.   Karime Duenas MD    This note was dictated using voice recognition software. Any grammatical errors, context distortions, or spelling errors are not intentional       Answers for HPI/ROS submitted by the patient on 3/21/2022  If you checked off any problems, how difficult have these problems made it for you to do your work, take care of things at home, or get along with other people?: Somewhat difficult  PHQ9 TOTAL SCORE: 6  JOHN 7 TOTAL SCORE: 5  Depression/Anxiety: Anxiety  Anxiety since last: : good  Other associated symotome: : No  Significant life event: : relationship concerns, grief or loss, other  Anxious:: Yes  Current substance use:: No  How many servings of fruits and vegetables do you eat daily?: 2-3  On average, how many sweetened beverages do you drink each day (Examples: soda, juice, sweet tea, etc.  Do NOT count diet or artificially sweetened beverages)?: 0  How many minutes a day do you exercise enough to make your heart beat faster?: 20 to 29  How many days a week do you exercise enough to make your heart beat faster?: 4  How many days per week do you miss taking your medication?: 0

## 2022-03-22 LAB
ALBUMIN SERPL-MCNC: 4.1 G/DL (ref 3.5–5)
ALP SERPL-CCNC: 76 U/L (ref 45–120)
ALT SERPL W P-5'-P-CCNC: 31 U/L (ref 0–45)
ANION GAP SERPL CALCULATED.3IONS-SCNC: 13 MMOL/L (ref 5–18)
AST SERPL W P-5'-P-CCNC: 20 U/L (ref 0–40)
BILIRUB SERPL-MCNC: 1.2 MG/DL (ref 0–1)
BUN SERPL-MCNC: 15 MG/DL (ref 8–22)
CALCIUM SERPL-MCNC: 9.7 MG/DL (ref 8.5–10.5)
CHLORIDE BLD-SCNC: 101 MMOL/L (ref 98–107)
CO2 SERPL-SCNC: 27 MMOL/L (ref 22–31)
CREAT SERPL-MCNC: 0.78 MG/DL (ref 0.6–1.1)
GFR SERPL CREATININE-BSD FRML MDRD: 90 ML/MIN/1.73M2
GLUCOSE BLD-MCNC: 96 MG/DL (ref 70–125)
HIV 1+2 AB+HIV1 P24 AG SERPL QL IA: NEGATIVE
POTASSIUM BLD-SCNC: 4.3 MMOL/L (ref 3.5–5)
PROT SERPL-MCNC: 7.3 G/DL (ref 6–8)
SODIUM SERPL-SCNC: 141 MMOL/L (ref 136–145)
T PALLIDUM AB SER QL: NONREACTIVE
TSH SERPL DL<=0.005 MIU/L-ACNC: 1.47 UIU/ML (ref 0.3–5)

## 2022-03-22 ASSESSMENT — PATIENT HEALTH QUESTIONNAIRE - PHQ9: SUM OF ALL RESPONSES TO PHQ QUESTIONS 1-9: 6

## 2022-03-23 LAB
C TRACH DNA SPEC QL PROBE+SIG AMP: NEGATIVE
N GONORRHOEA DNA SPEC QL NAA+PROBE: NEGATIVE

## 2022-04-05 ENCOUNTER — HOSPITAL ENCOUNTER (OUTPATIENT)
Dept: ULTRASOUND IMAGING | Facility: CLINIC | Age: 52
Discharge: HOME | End: 2022-04-05
Attending: FAMILY MEDICINE | Admitting: FAMILY MEDICINE
Payer: COMMERCIAL

## 2022-04-05 DIAGNOSIS — R10.2 PELVIC PAIN IN FEMALE: ICD-10-CM

## 2022-04-05 DIAGNOSIS — N89.8 VAGINAL DISCHARGE: ICD-10-CM

## 2022-04-05 PROCEDURE — 76856 US EXAM PELVIC COMPLETE: CPT

## 2022-05-16 ENCOUNTER — MYC REFILL (OUTPATIENT)
Dept: FAMILY MEDICINE | Facility: CLINIC | Age: 52
End: 2022-05-16
Payer: COMMERCIAL

## 2022-05-16 DIAGNOSIS — F41.9 ANXIETY: ICD-10-CM

## 2022-05-17 RX ORDER — CITALOPRAM HYDROBROMIDE 20 MG/1
30 TABLET ORAL DAILY
Qty: 135 TABLET | Refills: 0 | Status: SHIPPED | OUTPATIENT
Start: 2022-05-17 | End: 2022-08-19

## 2022-05-17 NOTE — TELEPHONE ENCOUNTER
"Routing refill request to provider for review/approval because:  Pt reports has changed dose please review rx sig    Last Written Prescription Date:  2/21/22  Last Fill Quantity: 90,  # refills: 0   Last office visit provider:  3/21/22     Requested Prescriptions   Pending Prescriptions Disp Refills     citalopram (CELEXA) 20 MG tablet 90 tablet 0     Sig: Take 1 tablet (20 mg) by mouth daily       SSRIs Protocol Passed - 5/16/2022 10:03 PM        Passed - Recent (12 mo) or future (30 days) visit within the authorizing provider's specialty     Patient has had an office visit with the authorizing provider or a provider within the authorizing providers department within the previous 12 mos or has a future within next 30 days. See \"Patient Info\" tab in inbasket, or \"Choose Columns\" in Meds & Orders section of the refill encounter.              Passed - Medication is active on med list        Passed - Patient is age 18 or older        Passed - No active pregnancy on record        Passed - No positive pregnancy test in last 12 months             Silvia Pryor RN 05/17/22 10:53 AM  "

## 2022-05-17 NOTE — TELEPHONE ENCOUNTER
Called and spoke with pt. Will schedule follow-up in August.    Renu Fletcher MA on 5/17/2022 at 4:26 PM

## 2022-05-17 NOTE — TELEPHONE ENCOUNTER
Prescription sent to pharmacy electronically. Please call and let patient know this should be available for  at the pharmacy.  Please assist patient with scheduling a follow-up appointment sometime within the next 2 to 3 months for follow-up of her medications.

## 2022-05-26 ENCOUNTER — TRANSFERRED RECORDS (OUTPATIENT)
Dept: HEALTH INFORMATION MANAGEMENT | Facility: CLINIC | Age: 52
End: 2022-05-26
Payer: COMMERCIAL

## 2022-06-03 ENCOUNTER — TRANSFERRED RECORDS (OUTPATIENT)
Dept: HEALTH INFORMATION MANAGEMENT | Facility: CLINIC | Age: 52
End: 2022-06-03
Payer: COMMERCIAL

## 2022-06-24 ENCOUNTER — HOSPITAL ENCOUNTER (OUTPATIENT)
Dept: MAMMOGRAPHY | Facility: CLINIC | Age: 52
Discharge: HOME | End: 2022-06-24
Attending: FAMILY MEDICINE | Admitting: FAMILY MEDICINE
Payer: COMMERCIAL

## 2022-06-24 DIAGNOSIS — Z12.31 ENCOUNTER FOR SCREENING MAMMOGRAM FOR BREAST CANCER: ICD-10-CM

## 2022-06-24 PROCEDURE — 77067 SCR MAMMO BI INCL CAD: CPT

## 2022-06-29 ENCOUNTER — TRANSFERRED RECORDS (OUTPATIENT)
Dept: HEALTH INFORMATION MANAGEMENT | Facility: CLINIC | Age: 52
End: 2022-06-29

## 2022-08-19 ENCOUNTER — MYC REFILL (OUTPATIENT)
Dept: FAMILY MEDICINE | Facility: CLINIC | Age: 52
End: 2022-08-19

## 2022-08-19 DIAGNOSIS — F41.9 ANXIETY: ICD-10-CM

## 2022-08-19 RX ORDER — CITALOPRAM HYDROBROMIDE 20 MG/1
30 TABLET ORAL DAILY
Qty: 135 TABLET | Refills: 2 | Status: SHIPPED | OUTPATIENT
Start: 2022-08-19

## 2022-08-19 NOTE — TELEPHONE ENCOUNTER
"Last Written Prescription Date:  5/17/22  Last Fill Quantity: 135,  # refills: 0   Last office visit provider:  3/21/22     Requested Prescriptions   Pending Prescriptions Disp Refills     citalopram (CELEXA) 20 MG tablet 135 tablet 0     Sig: Take 1.5 tablets (30 mg) by mouth daily       SSRIs Protocol Passed - 8/19/2022 11:36 AM        Passed - Recent (12 mo) or future (30 days) visit within the authorizing provider's specialty     Patient has had an office visit with the authorizing provider or a provider within the authorizing providers department within the previous 12 mos or has a future within next 30 days. See \"Patient Info\" tab in inbasket, or \"Choose Columns\" in Meds & Orders section of the refill encounter.              Passed - Medication is active on med list        Passed - Patient is age 18 or older        Passed - No active pregnancy on record        Passed - No positive pregnancy test in last 12 months             Dez Cuevas RN 08/19/22 11:36 AM  "

## 2022-10-01 ENCOUNTER — HEALTH MAINTENANCE LETTER (OUTPATIENT)
Age: 52
End: 2022-10-01

## 2022-10-14 ENCOUNTER — OFFICE VISIT (OUTPATIENT)
Dept: FAMILY MEDICINE | Facility: CLINIC | Age: 52
End: 2022-10-14
Payer: COMMERCIAL

## 2022-10-14 VITALS
RESPIRATION RATE: 14 BRPM | HEART RATE: 70 BPM | BODY MASS INDEX: 27 KG/M2 | SYSTOLIC BLOOD PRESSURE: 112 MMHG | OXYGEN SATURATION: 97 % | DIASTOLIC BLOOD PRESSURE: 78 MMHG | WEIGHT: 172.4 LBS

## 2022-10-14 DIAGNOSIS — D48.5 NEOPLASM OF UNCERTAIN BEHAVIOR OF SKIN: Primary | ICD-10-CM

## 2022-10-14 PROCEDURE — 99213 OFFICE O/P EST LOW 20 MIN: CPT | Performed by: FAMILY MEDICINE

## 2022-10-14 ASSESSMENT — PAIN SCALES - GENERAL: PAINLEVEL: NO PAIN (0)

## 2022-10-14 NOTE — PROGRESS NOTES
PROGRESS NOTE   10/14/2022    SUBJECTIVE:  Penelope Kennedy is a 52 year old female who presents for     Chief Complaint   Patient presents with     Mole     Would like her moles checked out and see if they need to be removed. Moles are in a spot where she has scratched them and they were bleeding.      Patient comes in today because she has had a couple of moles that she has had for quite some time and now they seem to be bothering her a little bit more and therefore she comes in for evaluation.  They are in areas on her back that seem to scratch against her bra or she will scratch them recently and then they started to bleed and that is been bothersome to her.  She notes that both of them been there for a long time.  She does not think that they are changing to any significant extent although they may be getting a little bit were raised off of her skin level.  She notes that she scratched one of them a while ago and it bled and that was a bit concerning to her so she thought she better come in to have them evaluated.  She otherwise is a fairly healthy female.     Patient Active Problem List   Diagnosis     Irregular Length Of Menstrual Periods     Ovarian Cyst     ASCUS with positive high risk HPV cervical     Anxiety       Current Outpatient Medications   Medication Sig Dispense Refill     citalopram (CELEXA) 20 MG tablet Take 1.5 tablets (30 mg) by mouth daily 135 tablet 2       No Known Allergies    Past Medical History:   Diagnosis Date     Abnormal glandular Papanicolaou smear of cervix 2018    had colpo and all fine since     Abnormal Pap smear of cervix 05/01/2018    had colposcopy, Dr Chapman at [facility], nl path     ASCUS with positive high risk HPV cervical 07/25/2006     Closed fracture of shaft of metatarsal bone of right foot with routine healing 05/2022     Depression with anxiety      H/O colonoscopy 04/01/2019    1 hyperplastic polyp found, repeat 10 yrs     Normal delivery     x2      Other abnormal Papanicolaou smear of cervix and cervical HPV(795.09) 01/01/2006    had colposcopy, all fine since     Ovarian cyst        Past Surgical History:   Procedure Laterality Date     COLPOSCOPY  1999, 2006, 2018    2018, nl pathology     OTHER SURGICAL HISTORY  1987    elective abortionpregnancy termination     PELVIC LAPAROSCOPY  2011    for heavy bleeding and missed periods, noted endometriosis       History   Smoking Status     Former     Packs/day: 0.50     Years: 20.00     Types: Cigarettes   Smokeless Tobacco     Never       OBJECTIVE:     /78 (BP Location: Right arm, Patient Position: Sitting, Cuff Size: Adult Regular)   Pulse 70   Resp 14   Wt 78.2 kg (172 lb 6.4 oz)   SpO2 97%   BMI 27.00 kg/m      Physical Exam:  GENERAL APPEARANCE: A&A, NAD, well hydrated, well nourished  SKIN:  Normal skin turgor, no lesions/rashes   On her back over the left shoulder in the upper region she has a round lesion that is about 8 mm in diameter which has irregular coloring in the middle.  It looks like it is a little discolored compared to the rest of the lesion in the very center of the lesion.  There is no umbilication noted.  The entire lesion is more flesh-colored then pigmented.  It is nontender patient and shows no sign of inflammation.  She also has a classic looking mole on her left upper back/flank area which again is raised off of the skin and is a darker brown in color.  It has a uniform color to it and does not appear to be secondarily infected in any way  CV: RRR, no M/G/R   LUNGS: CTAB  EXTREMITY: no swelling noted.  Full range of motion of all 4 extremities.   NEURO: no gross deficits   PSYCHIATRIC;  Mood appropriate, memory intact        ASSESSMENT/PLAN:     Neoplasm of uncertain behavior of skin  - Adult Dermatology Referral    Patient comes in today for evaluation of moles on her back.  She has 1 in her left shoulder region on her backside as well as one in the left upper back flank  area.  Both of them she has had for quite some time but they do seem like they are growing or changing a little bit and that they seem to be getting more raised off of her skin level.  They are itchy for her and she itches them and then they bleed.  I do think that the one in her left shoulder region has enough discoloration to add enough pigmentation changes within it that I do think it is to be evaluated by the dermatologist and I did explain that to her.  Dermatology referral was placed today.  Someone should contact her regarding getting this appointment set up.  If she does not hear from someone she certainly should let me know.  We discussed the fact that she is due for hepatitis B vaccination as well as shingles vaccination COVID booster and flu vaccination.  She declines all of those today.  She may get those in the future.  She is also overdue for physical exam we did discuss that today as well.  Karime Duenas MD    This note was dictated using voice recognition software. Any grammatical errors, context distortions, or spelling errors are not intentional     History of Present Illness       Reason for visit:  Mole check    She eats 2-3 servings of fruits and vegetables daily.She consumes 0 sweetened beverage(s) daily.She exercises with enough effort to increase her heart rate 20 to 29 minutes per day.  She exercises with enough effort to increase her heart rate 3 or less days per week.   She is taking medications regularly.

## 2023-02-05 ENCOUNTER — HEALTH MAINTENANCE LETTER (OUTPATIENT)
Age: 53
End: 2023-02-05

## 2023-05-22 ENCOUNTER — TRANSFERRED RECORDS (OUTPATIENT)
Dept: HEALTH INFORMATION MANAGEMENT | Facility: CLINIC | Age: 53
End: 2023-05-22
Payer: COMMERCIAL

## 2024-03-03 ENCOUNTER — HEALTH MAINTENANCE LETTER (OUTPATIENT)
Age: 54
End: 2024-03-03

## 2024-09-29 ENCOUNTER — HEALTH MAINTENANCE LETTER (OUTPATIENT)
Age: 54
End: 2024-09-29

## 2025-03-16 ENCOUNTER — HEALTH MAINTENANCE LETTER (OUTPATIENT)
Age: 55
End: 2025-03-16